# Patient Record
Sex: FEMALE | Race: WHITE | Employment: OTHER | URBAN - METROPOLITAN AREA
[De-identification: names, ages, dates, MRNs, and addresses within clinical notes are randomized per-mention and may not be internally consistent; named-entity substitution may affect disease eponyms.]

---

## 2017-09-11 ENCOUNTER — HOSPITAL ENCOUNTER (INPATIENT)
Age: 82
LOS: 7 days | Discharge: NURSING, REHAB OR LONG-TERM CARE | DRG: 186 | End: 2017-09-18
Attending: EMERGENCY MEDICINE | Admitting: HOSPITALIST
Payer: MEDICARE

## 2017-09-11 ENCOUNTER — APPOINTMENT (OUTPATIENT)
Dept: CT IMAGING | Age: 82
DRG: 186 | End: 2017-09-11
Payer: MEDICARE

## 2017-09-11 DIAGNOSIS — R91.8 MASS OF LUNG: ICD-10-CM

## 2017-09-11 DIAGNOSIS — J90 UNSPECIFIED PLEURAL EFFUSION: ICD-10-CM

## 2017-09-11 DIAGNOSIS — R06.00 DYSPNEA AND RESPIRATORY ABNORMALITIES: ICD-10-CM

## 2017-09-11 DIAGNOSIS — J96.00 ACUTE RESPIRATORY FAILURE, UNSPECIFIED WHETHER WITH HYPOXIA OR HYPERCAPNIA (HCC): Primary | ICD-10-CM

## 2017-09-11 DIAGNOSIS — R06.89 DYSPNEA AND RESPIRATORY ABNORMALITIES: ICD-10-CM

## 2017-09-11 DIAGNOSIS — I16.0 HYPERTENSIVE URGENCY: ICD-10-CM

## 2017-09-11 PROBLEM — R00.0 TACHYCARDIA: Status: ACTIVE | Noted: 2017-09-11

## 2017-09-11 PROBLEM — R06.82 TACHYPNEA: Status: ACTIVE | Noted: 2017-09-11

## 2017-09-11 LAB
ALBUMIN SERPL-MCNC: 3.5 G/DL (ref 3.9–4.9)
ALP BLD-CCNC: 70 U/L (ref 40–130)
ALT SERPL-CCNC: 23 U/L (ref 0–33)
ANION GAP SERPL CALCULATED.3IONS-SCNC: 12 MEQ/L (ref 7–13)
AST SERPL-CCNC: 33 U/L (ref 0–35)
BILIRUB SERPL-MCNC: 0.9 MG/DL (ref 0–1.2)
BUN BLDV-MCNC: 17 MG/DL (ref 8–23)
CALCIUM SERPL-MCNC: 8.6 MG/DL (ref 8.6–10.2)
CEA: 1.3 NG/ML (ref 0–5.5)
CHLORIDE BLD-SCNC: 94 MEQ/L (ref 98–107)
CK MB: 4.4 NG/ML (ref 0–3.8)
CO2: 27 MEQ/L (ref 22–29)
CREAT SERPL-MCNC: 0.52 MG/DL (ref 0.5–0.9)
CREATINE KINASE-MB INDEX: 5.2 % (ref 0–3.5)
D DIMER: 12.84 MG/L FEU (ref 0–0.5)
GFR AFRICAN AMERICAN: >60
GFR NON-AFRICAN AMERICAN: >60
GLOBULIN: 3.2 G/DL (ref 2.3–3.5)
GLUCOSE BLD-MCNC: 127 MG/DL (ref 74–109)
HCT VFR BLD CALC: 33.6 % (ref 37–47)
HEMOGLOBIN: 11.1 G/DL (ref 12–16)
MCH RBC QN AUTO: 31.1 PG (ref 27–31.3)
MCHC RBC AUTO-ENTMCNC: 33.1 % (ref 33–37)
MCV RBC AUTO: 93.9 FL (ref 82–100)
PDW BLD-RTO: 15.2 % (ref 11.5–14.5)
PLATELET # BLD: 184 K/UL (ref 130–400)
PLATELET SLIDE REVIEW: ADEQUATE
POTASSIUM SERPL-SCNC: 4.5 MEQ/L (ref 3.5–5.1)
RBC # BLD: 3.58 M/UL (ref 4.2–5.4)
SEDIMENTATION RATE, ERYTHROCYTE: 48 MM (ref 0–30)
SODIUM BLD-SCNC: 133 MEQ/L (ref 132–144)
TOTAL CK: 85 U/L (ref 0–170)
TOTAL PROTEIN: 6.7 G/DL (ref 6.4–8.1)
TROPONIN: 0.02 NG/ML (ref 0–0.01)
TROPONIN: <0.01 NG/ML (ref 0–0.01)
WBC # BLD: 7.7 K/UL (ref 4.8–10.8)

## 2017-09-11 PROCEDURE — 6360000002 HC RX W HCPCS: Performed by: EMERGENCY MEDICINE

## 2017-09-11 PROCEDURE — 71250 CT THORAX DX C-: CPT

## 2017-09-11 PROCEDURE — 96375 TX/PRO/DX INJ NEW DRUG ADDON: CPT

## 2017-09-11 PROCEDURE — 2700000000 HC OXYGEN THERAPY PER DAY

## 2017-09-11 PROCEDURE — 96365 THER/PROPH/DIAG IV INF INIT: CPT

## 2017-09-11 PROCEDURE — 80053 COMPREHEN METABOLIC PANEL: CPT

## 2017-09-11 PROCEDURE — 82553 CREATINE MB FRACTION: CPT

## 2017-09-11 PROCEDURE — 36415 COLL VENOUS BLD VENIPUNCTURE: CPT

## 2017-09-11 PROCEDURE — 85027 COMPLETE CBC AUTOMATED: CPT

## 2017-09-11 PROCEDURE — 93005 ELECTROCARDIOGRAM TRACING: CPT

## 2017-09-11 PROCEDURE — 84484 ASSAY OF TROPONIN QUANT: CPT

## 2017-09-11 PROCEDURE — 2500000003 HC RX 250 WO HCPCS: Performed by: EMERGENCY MEDICINE

## 2017-09-11 PROCEDURE — 99285 EMERGENCY DEPT VISIT HI MDM: CPT

## 2017-09-11 PROCEDURE — 51702 INSERT TEMP BLADDER CATH: CPT

## 2017-09-11 PROCEDURE — 85652 RBC SED RATE AUTOMATED: CPT

## 2017-09-11 PROCEDURE — 82378 CARCINOEMBRYONIC ANTIGEN: CPT

## 2017-09-11 PROCEDURE — 94660 CPAP INITIATION&MGMT: CPT

## 2017-09-11 PROCEDURE — 82550 ASSAY OF CK (CPK): CPT

## 2017-09-11 PROCEDURE — 2000000000 HC ICU R&B

## 2017-09-11 PROCEDURE — 85379 FIBRIN DEGRADATION QUANT: CPT

## 2017-09-11 PROCEDURE — 87040 BLOOD CULTURE FOR BACTERIA: CPT

## 2017-09-11 PROCEDURE — 86141 C-REACTIVE PROTEIN HS: CPT

## 2017-09-11 RX ORDER — SODIUM CHLORIDE 0.9 % (FLUSH) 0.9 %
10 SYRINGE (ML) INJECTION PRN
Status: DISCONTINUED | OUTPATIENT
Start: 2017-09-11 | End: 2017-09-18 | Stop reason: HOSPADM

## 2017-09-11 RX ORDER — HYDROXYCHLOROQUINE SULFATE 200 MG/1
200 TABLET, FILM COATED ORAL DAILY
Status: DISCONTINUED | OUTPATIENT
Start: 2017-09-12 | End: 2017-09-18 | Stop reason: HOSPADM

## 2017-09-11 RX ORDER — PANTOPRAZOLE SODIUM 40 MG/1
40 GRANULE, DELAYED RELEASE ORAL
COMMUNITY
End: 2018-03-02 | Stop reason: CLARIF

## 2017-09-11 RX ORDER — FUROSEMIDE 10 MG/ML
40 INJECTION INTRAMUSCULAR; INTRAVENOUS 2 TIMES DAILY
Status: DISCONTINUED | OUTPATIENT
Start: 2017-09-12 | End: 2017-09-11

## 2017-09-11 RX ORDER — HYDROXYCHLOROQUINE SULFATE 200 MG/1
200 TABLET, FILM COATED ORAL DAILY
COMMUNITY
End: 2018-03-02 | Stop reason: CLARIF

## 2017-09-11 RX ORDER — ACETAMINOPHEN 325 MG/1
650 TABLET ORAL EVERY 4 HOURS PRN
Status: DISCONTINUED | OUTPATIENT
Start: 2017-09-11 | End: 2017-09-18 | Stop reason: HOSPADM

## 2017-09-11 RX ORDER — PANTOPRAZOLE SODIUM 40 MG/1
40 GRANULE, DELAYED RELEASE ORAL
Status: DISCONTINUED | OUTPATIENT
Start: 2017-09-12 | End: 2017-09-18 | Stop reason: HOSPADM

## 2017-09-11 RX ORDER — NITROGLYCERIN 20 MG/100ML
5 INJECTION INTRAVENOUS CONTINUOUS
Status: DISCONTINUED | OUTPATIENT
Start: 2017-09-11 | End: 2017-09-11 | Stop reason: HOSPADM

## 2017-09-11 RX ORDER — SODIUM CHLORIDE 0.9 % (FLUSH) 0.9 %
10 SYRINGE (ML) INJECTION EVERY 12 HOURS SCHEDULED
Status: DISCONTINUED | OUTPATIENT
Start: 2017-09-11 | End: 2017-09-18 | Stop reason: HOSPADM

## 2017-09-11 RX ORDER — FUROSEMIDE 10 MG/ML
40 INJECTION INTRAMUSCULAR; INTRAVENOUS ONCE
Status: COMPLETED | OUTPATIENT
Start: 2017-09-11 | End: 2017-09-11

## 2017-09-11 RX ORDER — ONDANSETRON 2 MG/ML
4 INJECTION INTRAMUSCULAR; INTRAVENOUS EVERY 6 HOURS PRN
Status: DISCONTINUED | OUTPATIENT
Start: 2017-09-11 | End: 2017-09-18 | Stop reason: HOSPADM

## 2017-09-11 RX ORDER — DOCUSATE SODIUM 100 MG/1
100 CAPSULE, LIQUID FILLED ORAL 2 TIMES DAILY
Status: DISCONTINUED | OUTPATIENT
Start: 2017-09-11 | End: 2017-09-16 | Stop reason: CLARIF

## 2017-09-11 RX ADMIN — FUROSEMIDE 40 MG: 10 INJECTION, SOLUTION INTRAVENOUS at 19:23

## 2017-09-11 ASSESSMENT — ENCOUNTER SYMPTOMS
COLOR CHANGE: 0
SHORTNESS OF BREATH: 1
RHINORRHEA: 0
EYE DISCHARGE: 0
PHOTOPHOBIA: 0
ABDOMINAL DISTENTION: 0
ABDOMINAL PAIN: 0
VOMITING: 0
FACIAL SWELLING: 0
WHEEZING: 0

## 2017-09-11 ASSESSMENT — PAIN SCALES - GENERAL: PAINLEVEL_OUTOF10: 0

## 2017-09-12 ENCOUNTER — APPOINTMENT (OUTPATIENT)
Dept: GENERAL RADIOLOGY | Age: 82
DRG: 186 | End: 2017-09-12
Payer: MEDICARE

## 2017-09-12 PROBLEM — I21.4 NSTEMI (NON-ST ELEVATED MYOCARDIAL INFARCTION) (HCC): Status: ACTIVE | Noted: 2017-09-12

## 2017-09-12 LAB
ANION GAP SERPL CALCULATED.3IONS-SCNC: 15 MEQ/L (ref 7–13)
BASOPHILS ABSOLUTE: 0 K/UL (ref 0–0.2)
BASOPHILS RELATIVE PERCENT: 0.6 %
BUN BLDV-MCNC: 16 MG/DL (ref 8–23)
C-REACTIVE PROTEIN, HIGH SENSITIVITY: 53.3 MG/L (ref 0–5)
CALCIUM SERPL-MCNC: 8.5 MG/DL (ref 8.6–10.2)
CHLORIDE BLD-SCNC: 91 MEQ/L (ref 98–107)
CK MB: 3.7 NG/ML (ref 0–3.8)
CK MB: 3.9 NG/ML (ref 0–3.8)
CO2: 30 MEQ/L (ref 22–29)
CREAT SERPL-MCNC: 0.49 MG/DL (ref 0.5–0.9)
CREATINE KINASE-MB INDEX: 2.3 % (ref 0–3.5)
CREATINE KINASE-MB INDEX: 3.4 % (ref 0–3.5)
EOSINOPHILS ABSOLUTE: 0.1 K/UL (ref 0–0.7)
EOSINOPHILS RELATIVE PERCENT: 1.1 %
GFR AFRICAN AMERICAN: >60
GFR NON-AFRICAN AMERICAN: >60
GLUCOSE BLD-MCNC: 95 MG/DL (ref 74–109)
HCT VFR BLD CALC: 34.5 % (ref 37–47)
HEMOGLOBIN: 11.4 G/DL (ref 12–16)
LV EF: 53 %
LVEF MODALITY: NORMAL
LYMPHOCYTES ABSOLUTE: 1.2 K/UL (ref 1–4.8)
LYMPHOCYTES RELATIVE PERCENT: 14.7 %
MAGNESIUM: 1.8 MG/DL (ref 1.7–2.3)
MCH RBC QN AUTO: 31.2 PG (ref 27–31.3)
MCHC RBC AUTO-ENTMCNC: 33 % (ref 33–37)
MCV RBC AUTO: 94.4 FL (ref 82–100)
MONOCYTES ABSOLUTE: 0.9 K/UL (ref 0.2–0.8)
MONOCYTES RELATIVE PERCENT: 11.7 %
NEUTROPHILS ABSOLUTE: 5.7 K/UL (ref 1.4–6.5)
NEUTROPHILS RELATIVE PERCENT: 71.9 %
PDW BLD-RTO: 15.3 % (ref 11.5–14.5)
PLATELET # BLD: 233 K/UL (ref 130–400)
POTASSIUM SERPL-SCNC: 3.6 MEQ/L (ref 3.5–5.1)
POTASSIUM SERPL-SCNC: 3.7 MEQ/L (ref 3.5–5.1)
RBC # BLD: 3.65 M/UL (ref 4.2–5.4)
SODIUM BLD-SCNC: 136 MEQ/L (ref 132–144)
TOTAL CK: 116 U/L (ref 0–170)
TOTAL CK: 163 U/L (ref 0–170)
TROPONIN: 0.02 NG/ML (ref 0–0.01)
TROPONIN: 0.02 NG/ML (ref 0–0.01)
WBC # BLD: 7.9 K/UL (ref 4.8–10.8)

## 2017-09-12 PROCEDURE — 85025 COMPLETE CBC W/AUTO DIFF WBC: CPT

## 2017-09-12 PROCEDURE — 84132 ASSAY OF SERUM POTASSIUM: CPT

## 2017-09-12 PROCEDURE — 82553 CREATINE MB FRACTION: CPT

## 2017-09-12 PROCEDURE — 6360000002 HC RX W HCPCS: Performed by: INTERNAL MEDICINE

## 2017-09-12 PROCEDURE — 82550 ASSAY OF CK (CPK): CPT

## 2017-09-12 PROCEDURE — 83735 ASSAY OF MAGNESIUM: CPT

## 2017-09-12 PROCEDURE — 93306 TTE W/DOPPLER COMPLETE: CPT

## 2017-09-12 PROCEDURE — G8987 SELF CARE CURRENT STATUS: HCPCS

## 2017-09-12 PROCEDURE — 84484 ASSAY OF TROPONIN QUANT: CPT

## 2017-09-12 PROCEDURE — 2580000003 HC RX 258: Performed by: INTERNAL MEDICINE

## 2017-09-12 PROCEDURE — 99223 1ST HOSP IP/OBS HIGH 75: CPT | Performed by: INTERNAL MEDICINE

## 2017-09-12 PROCEDURE — 71010 XR CHEST PORTABLE: CPT

## 2017-09-12 PROCEDURE — 6370000000 HC RX 637 (ALT 250 FOR IP): Performed by: HOSPITALIST

## 2017-09-12 PROCEDURE — 36415 COLL VENOUS BLD VENIPUNCTURE: CPT

## 2017-09-12 PROCEDURE — 2580000003 HC RX 258: Performed by: HOSPITALIST

## 2017-09-12 PROCEDURE — 2060000000 HC ICU INTERMEDIATE R&B

## 2017-09-12 PROCEDURE — 2700000000 HC OXYGEN THERAPY PER DAY

## 2017-09-12 PROCEDURE — 6360000002 HC RX W HCPCS: Performed by: HOSPITALIST

## 2017-09-12 PROCEDURE — 97165 OT EVAL LOW COMPLEX 30 MIN: CPT

## 2017-09-12 PROCEDURE — 80048 BASIC METABOLIC PNL TOTAL CA: CPT

## 2017-09-12 PROCEDURE — G8988 SELF CARE GOAL STATUS: HCPCS

## 2017-09-12 PROCEDURE — 51702 INSERT TEMP BLADDER CATH: CPT

## 2017-09-12 RX ORDER — POTASSIUM CHLORIDE 20 MEQ/1
40 TABLET, EXTENDED RELEASE ORAL PRN
Status: DISCONTINUED | OUTPATIENT
Start: 2017-09-12 | End: 2017-09-18 | Stop reason: HOSPADM

## 2017-09-12 RX ORDER — FOLIC ACID 1 MG/1
1 TABLET ORAL DAILY
COMMUNITY
End: 2018-03-02 | Stop reason: CLARIF

## 2017-09-12 RX ORDER — POTASSIUM CHLORIDE 7.45 MG/ML
10 INJECTION INTRAVENOUS PRN
Status: DISCONTINUED | OUTPATIENT
Start: 2017-09-12 | End: 2017-09-18 | Stop reason: HOSPADM

## 2017-09-12 RX ORDER — POTASSIUM CHLORIDE 20MEQ/15ML
40 LIQUID (ML) ORAL PRN
Status: DISCONTINUED | OUTPATIENT
Start: 2017-09-12 | End: 2017-09-18 | Stop reason: HOSPADM

## 2017-09-12 RX ADMIN — FUROSEMIDE 5 MG/HR: 10 INJECTION, SOLUTION INTRAVENOUS at 00:18

## 2017-09-12 RX ADMIN — Medication 10 ML: at 00:22

## 2017-09-12 RX ADMIN — ENOXAPARIN SODIUM 40 MG: 40 INJECTION SUBCUTANEOUS at 07:40

## 2017-09-12 RX ADMIN — HYDROXYCHLOROQUINE SULFATE 200 MG: 200 TABLET, FILM COATED ORAL at 07:40

## 2017-09-12 RX ADMIN — FUROSEMIDE 5 MG/HR: 10 INJECTION, SOLUTION INTRAVENOUS at 17:18

## 2017-09-12 RX ADMIN — PANTOPRAZOLE SODIUM 40 MG: 40 GRANULE, DELAYED RELEASE ORAL at 07:40

## 2017-09-12 RX ADMIN — Medication 10 ML: at 21:20

## 2017-09-12 RX ADMIN — DOCUSATE SODIUM 100 MG: 100 CAPSULE, LIQUID FILLED ORAL at 07:40

## 2017-09-12 RX ADMIN — DOCUSATE SODIUM 100 MG: 100 CAPSULE, LIQUID FILLED ORAL at 21:20

## 2017-09-12 RX ADMIN — ACETAMINOPHEN 650 MG: 325 TABLET ORAL at 07:51

## 2017-09-12 ASSESSMENT — PAIN DESCRIPTION - ONSET: ONSET: GRADUAL

## 2017-09-12 ASSESSMENT — PAIN SCALES - GENERAL
PAINLEVEL_OUTOF10: 0
PAINLEVEL_OUTOF10: 0
PAINLEVEL_OUTOF10: 3
PAINLEVEL_OUTOF10: 0
PAINLEVEL_OUTOF10: 5
PAINLEVEL_OUTOF10: 5

## 2017-09-12 ASSESSMENT — PAIN DESCRIPTION - PROGRESSION
CLINICAL_PROGRESSION: NOT CHANGED

## 2017-09-12 ASSESSMENT — PAIN DESCRIPTION - LOCATION: LOCATION: FOOT

## 2017-09-12 ASSESSMENT — PAIN DESCRIPTION - PAIN TYPE: TYPE: CHRONIC PAIN

## 2017-09-12 ASSESSMENT — PAIN DESCRIPTION - ORIENTATION: ORIENTATION: LEFT

## 2017-09-13 ENCOUNTER — APPOINTMENT (OUTPATIENT)
Dept: CT IMAGING | Age: 82
DRG: 186 | End: 2017-09-13
Payer: MEDICARE

## 2017-09-13 LAB
ALBUMIN SERPL-MCNC: 3.1 G/DL (ref 3.9–4.9)
ALP BLD-CCNC: 60 U/L (ref 40–130)
ALT SERPL-CCNC: 18 U/L (ref 0–33)
ANION GAP SERPL CALCULATED.3IONS-SCNC: 17 MEQ/L (ref 7–13)
AST SERPL-CCNC: 24 U/L (ref 0–35)
BASE EXCESS ARTERIAL: 5 (ref -3–3)
BILIRUB SERPL-MCNC: 0.8 MG/DL (ref 0–1.2)
BUN BLDV-MCNC: 28 MG/DL (ref 8–23)
CALCIUM SERPL-MCNC: 8.1 MG/DL (ref 8.6–10.2)
CHLORIDE BLD-SCNC: 89 MEQ/L (ref 98–107)
CK MB: 3.3 NG/ML (ref 0–3.8)
CO2: 29 MEQ/L (ref 22–29)
CREAT SERPL-MCNC: 1.06 MG/DL (ref 0.5–0.9)
CREATINE KINASE-MB INDEX: 2.1 % (ref 0–3.5)
GFR AFRICAN AMERICAN: 58.1
GFR NON-AFRICAN AMERICAN: 48
GLOBULIN: 3 G/DL (ref 2.3–3.5)
GLUCOSE BLD-MCNC: 119 MG/DL (ref 74–109)
HCO3 ARTERIAL: 29.5 MMOL/L (ref 21–29)
HCT VFR BLD CALC: 33.6 % (ref 37–47)
HEMOGLOBIN: 11 G/DL (ref 12–16)
INR BLD: 1.2
LACTATE: 7.35 MMOL/L (ref 0.4–2)
MAGNESIUM: 1.7 MG/DL (ref 1.7–2.3)
MCH RBC QN AUTO: 30.7 PG (ref 27–31.3)
MCHC RBC AUTO-ENTMCNC: 32.7 % (ref 33–37)
MCV RBC AUTO: 93.8 FL (ref 82–100)
O2 SAT, ARTERIAL: 98 % (ref 93–100)
PCO2 ARTERIAL: 47 MM HG (ref 35–45)
PDW BLD-RTO: 15.1 % (ref 11.5–14.5)
PERFORMED ON: ABNORMAL
PH ARTERIAL: 7.41 (ref 7.35–7.45)
PLATELET # BLD: 234 K/UL (ref 130–400)
PO2 ARTERIAL: 114 MM HG (ref 75–108)
POC SAMPLE TYPE: ABNORMAL
POTASSIUM SERPL-SCNC: 3.3 MEQ/L (ref 3.5–5.1)
POTASSIUM SERPL-SCNC: 3.3 MEQ/L (ref 3.5–5.1)
POTASSIUM SERPL-SCNC: 3.4 MEQ/L (ref 3.5–5.1)
PROTHROMBIN TIME: 12.3 SEC (ref 8.1–13.7)
RBC # BLD: 3.58 M/UL (ref 4.2–5.4)
SODIUM BLD-SCNC: 135 MEQ/L (ref 132–144)
TCO2 ARTERIAL: 31 (ref 22–29)
TOTAL CK: 160 U/L (ref 0–170)
TOTAL PROTEIN: 6.1 G/DL (ref 6.4–8.1)
TROPONIN: 0.03 NG/ML (ref 0–0.01)
WBC # BLD: 11.4 K/UL (ref 4.8–10.8)

## 2017-09-13 PROCEDURE — 6370000000 HC RX 637 (ALT 250 FOR IP): Performed by: HOSPITALIST

## 2017-09-13 PROCEDURE — 84132 ASSAY OF SERUM POTASSIUM: CPT

## 2017-09-13 PROCEDURE — 93010 ELECTROCARDIOGRAM REPORT: CPT | Performed by: INTERNAL MEDICINE

## 2017-09-13 PROCEDURE — 82803 BLOOD GASES ANY COMBINATION: CPT

## 2017-09-13 PROCEDURE — 82550 ASSAY OF CK (CPK): CPT

## 2017-09-13 PROCEDURE — 2580000003 HC RX 258: Performed by: INTERNAL MEDICINE

## 2017-09-13 PROCEDURE — 6360000002 HC RX W HCPCS: Performed by: INTERNAL MEDICINE

## 2017-09-13 PROCEDURE — 85610 PROTHROMBIN TIME: CPT

## 2017-09-13 PROCEDURE — 84484 ASSAY OF TROPONIN QUANT: CPT

## 2017-09-13 PROCEDURE — 2580000003 HC RX 258: Performed by: ANESTHESIOLOGY

## 2017-09-13 PROCEDURE — 36600 WITHDRAWAL OF ARTERIAL BLOOD: CPT

## 2017-09-13 PROCEDURE — 80053 COMPREHEN METABOLIC PANEL: CPT

## 2017-09-13 PROCEDURE — 83735 ASSAY OF MAGNESIUM: CPT

## 2017-09-13 PROCEDURE — 95816 EEG AWAKE AND DROWSY: CPT

## 2017-09-13 PROCEDURE — 6360000002 HC RX W HCPCS: Performed by: HOSPITALIST

## 2017-09-13 PROCEDURE — 70450 CT HEAD/BRAIN W/O DYE: CPT

## 2017-09-13 PROCEDURE — 83605 ASSAY OF LACTIC ACID: CPT

## 2017-09-13 PROCEDURE — 2000000000 HC ICU R&B

## 2017-09-13 PROCEDURE — 85027 COMPLETE CBC AUTOMATED: CPT

## 2017-09-13 PROCEDURE — 6360000002 HC RX W HCPCS: Performed by: ANESTHESIOLOGY

## 2017-09-13 PROCEDURE — G8996 SWALLOW CURRENT STATUS: HCPCS

## 2017-09-13 PROCEDURE — 2700000000 HC OXYGEN THERAPY PER DAY

## 2017-09-13 PROCEDURE — 82553 CREATINE MB FRACTION: CPT

## 2017-09-13 PROCEDURE — G8997 SWALLOW GOAL STATUS: HCPCS

## 2017-09-13 PROCEDURE — 2580000003 HC RX 258: Performed by: HOSPITALIST

## 2017-09-13 PROCEDURE — 36415 COLL VENOUS BLD VENIPUNCTURE: CPT

## 2017-09-13 PROCEDURE — 92610 EVALUATE SWALLOWING FUNCTION: CPT

## 2017-09-13 PROCEDURE — 99291 CRITICAL CARE FIRST HOUR: CPT | Performed by: ANESTHESIOLOGY

## 2017-09-13 RX ORDER — POTASSIUM CHLORIDE 7.45 MG/ML
20 INJECTION INTRAVENOUS ONCE
Status: COMPLETED | OUTPATIENT
Start: 2017-09-13 | End: 2017-09-13

## 2017-09-13 RX ADMIN — POTASSIUM CHLORIDE 20 MEQ: 10 INJECTION, SOLUTION INTRAVENOUS at 14:44

## 2017-09-13 RX ADMIN — Medication 10 ML: at 23:05

## 2017-09-13 RX ADMIN — MAGNESIUM SULFATE HEPTAHYDRATE 1 G: 500 INJECTION, SOLUTION INTRAMUSCULAR; INTRAVENOUS at 13:39

## 2017-09-13 RX ADMIN — LEVETIRACETAM 750 MG: 100 INJECTION, SOLUTION INTRAVENOUS at 13:28

## 2017-09-13 RX ADMIN — ACETAMINOPHEN 650 MG: 325 TABLET ORAL at 04:23

## 2017-09-13 RX ADMIN — FUROSEMIDE 2 MG/HR: 10 INJECTION, SOLUTION INTRAVENOUS at 12:18

## 2017-09-13 RX ADMIN — Medication 10 ML: at 12:18

## 2017-09-13 ASSESSMENT — PAIN SCALES - GENERAL
PAINLEVEL_OUTOF10: 0
PAINLEVEL_OUTOF10: 5
PAINLEVEL_OUTOF10: 0

## 2017-09-14 ENCOUNTER — APPOINTMENT (OUTPATIENT)
Dept: MRI IMAGING | Age: 82
DRG: 186 | End: 2017-09-14
Payer: MEDICARE

## 2017-09-14 LAB
CK MB: 2.6 NG/ML (ref 0–3.8)
CREATINE KINASE-MB INDEX: 1.6 % (ref 0–3.5)
POTASSIUM SERPL-SCNC: 4 MEQ/L (ref 3.5–5.1)
TOTAL CK: 164 U/L (ref 0–170)
TROPONIN: 0.04 NG/ML (ref 0–0.01)

## 2017-09-14 PROCEDURE — 2580000003 HC RX 258: Performed by: PSYCHIATRY & NEUROLOGY

## 2017-09-14 PROCEDURE — 6360000002 HC RX W HCPCS: Performed by: PSYCHIATRY & NEUROLOGY

## 2017-09-14 PROCEDURE — 6370000000 HC RX 637 (ALT 250 FOR IP): Performed by: HOSPITALIST

## 2017-09-14 PROCEDURE — 82553 CREATINE MB FRACTION: CPT

## 2017-09-14 PROCEDURE — 1210000000 HC MED SURG R&B

## 2017-09-14 PROCEDURE — 82550 ASSAY OF CK (CPK): CPT

## 2017-09-14 PROCEDURE — 70551 MRI BRAIN STEM W/O DYE: CPT

## 2017-09-14 PROCEDURE — 84132 ASSAY OF SERUM POTASSIUM: CPT

## 2017-09-14 PROCEDURE — 84484 ASSAY OF TROPONIN QUANT: CPT

## 2017-09-14 PROCEDURE — 2580000003 HC RX 258: Performed by: HOSPITALIST

## 2017-09-14 PROCEDURE — 99231 SBSQ HOSP IP/OBS SF/LOW 25: CPT | Performed by: ANESTHESIOLOGY

## 2017-09-14 PROCEDURE — 36415 COLL VENOUS BLD VENIPUNCTURE: CPT

## 2017-09-14 PROCEDURE — 6370000000 HC RX 637 (ALT 250 FOR IP): Performed by: NURSE PRACTITIONER

## 2017-09-14 RX ADMIN — LEVETIRACETAM 500 MG: 100 INJECTION, SOLUTION INTRAVENOUS at 22:27

## 2017-09-14 RX ADMIN — ACETAMINOPHEN 650 MG: 325 TABLET ORAL at 08:45

## 2017-09-14 RX ADMIN — POTASSIUM CHLORIDE 40 MEQ: 20 SOLUTION ORAL at 00:21

## 2017-09-14 RX ADMIN — LEVETIRACETAM 500 MG: 100 INJECTION, SOLUTION INTRAVENOUS at 11:26

## 2017-09-14 RX ADMIN — Medication 10 ML: at 21:18

## 2017-09-14 RX ADMIN — LEVETIRACETAM 500 MG: 100 INJECTION, SOLUTION INTRAVENOUS at 01:00

## 2017-09-14 RX ADMIN — Medication 10 ML: at 08:55

## 2017-09-14 RX ADMIN — ACETAMINOPHEN 650 MG: 325 TABLET ORAL at 02:04

## 2017-09-14 RX ADMIN — DOCUSATE SODIUM 100 MG: 100 CAPSULE, LIQUID FILLED ORAL at 08:46

## 2017-09-14 RX ADMIN — DOCUSATE SODIUM 100 MG: 100 CAPSULE, LIQUID FILLED ORAL at 21:17

## 2017-09-14 RX ADMIN — PANTOPRAZOLE SODIUM 40 MG: 40 GRANULE, DELAYED RELEASE ORAL at 05:54

## 2017-09-14 RX ADMIN — HYDROXYCHLOROQUINE SULFATE 200 MG: 200 TABLET, FILM COATED ORAL at 08:46

## 2017-09-14 ASSESSMENT — PAIN SCALES - GENERAL
PAINLEVEL_OUTOF10: 0
PAINLEVEL_OUTOF10: 0
PAINLEVEL_OUTOF10: 4
PAINLEVEL_OUTOF10: 0
PAINLEVEL_OUTOF10: 5
PAINLEVEL_OUTOF10: 0
PAINLEVEL_OUTOF10: 4
PAINLEVEL_OUTOF10: 0

## 2017-09-14 ASSESSMENT — PAIN DESCRIPTION - PAIN TYPE: TYPE: CHRONIC PAIN

## 2017-09-14 ASSESSMENT — PAIN DESCRIPTION - LOCATION
LOCATION: MOUTH
LOCATION: CHEST

## 2017-09-14 ASSESSMENT — PAIN DESCRIPTION - ORIENTATION
ORIENTATION: LEFT
ORIENTATION: LOWER;RIGHT

## 2017-09-14 ASSESSMENT — PAIN DESCRIPTION - DESCRIPTORS: DESCRIPTORS: SORE

## 2017-09-15 ENCOUNTER — APPOINTMENT (OUTPATIENT)
Dept: ULTRASOUND IMAGING | Age: 82
DRG: 186 | End: 2017-09-15
Payer: MEDICARE

## 2017-09-15 ENCOUNTER — APPOINTMENT (OUTPATIENT)
Dept: GENERAL RADIOLOGY | Age: 82
DRG: 186 | End: 2017-09-15
Payer: MEDICARE

## 2017-09-15 LAB
AMYLASE FLUID: 29 U/L
ANION GAP SERPL CALCULATED.3IONS-SCNC: 16 MEQ/L (ref 7–13)
APPEARANCE FLUID: CLEAR
BUN BLDV-MCNC: 27 MG/DL (ref 8–23)
CALCIUM SERPL-MCNC: 8.5 MG/DL (ref 8.6–10.2)
CELL COUNT FLUID TYPE: NORMAL
CHLORIDE BLD-SCNC: 94 MEQ/L (ref 98–107)
CLOT EVALUATION: NORMAL
CO2: 28 MEQ/L (ref 22–29)
COLOR FLUID: YELLOW
CREAT SERPL-MCNC: 0.71 MG/DL (ref 0.5–0.9)
FLUID TYPE: NORMAL
GFR AFRICAN AMERICAN: >60
GFR NON-AFRICAN AMERICAN: >60
GLUCOSE BLD-MCNC: 161 MG/DL (ref 74–109)
GLUCOSE, FLUID: 111.5 MG/DL
HCT VFR BLD CALC: 36.2 % (ref 37–47)
HEMOGLOBIN: 11.6 G/DL (ref 12–16)
LD, FLUID: 139 U/L
LYMPHOCYTES, BODY FLUID: 95 %
MAGNESIUM: 2.2 MG/DL (ref 1.7–2.3)
MCH RBC QN AUTO: 30.5 PG (ref 27–31.3)
MCHC RBC AUTO-ENTMCNC: 32.1 % (ref 33–37)
MCV RBC AUTO: 95.2 FL (ref 82–100)
MONOCYTE, FLUID: 4 %
NEUTROPHIL, FLUID: 1 %
NUCLEATED CELLS FLUID: 518 /CUMM
NUMBER OF CELLS COUNTED FLUID: 100
PDW BLD-RTO: 15.5 % (ref 11.5–14.5)
PLATELET # BLD: 275 K/UL (ref 130–400)
POTASSIUM SERPL-SCNC: 3.9 MEQ/L (ref 3.5–5.1)
PROTEIN FLUID: 2.6 G/DL
RBC # BLD: 3.8 M/UL (ref 4.2–5.4)
RBC FLUID: 890 /CUMM
SODIUM BLD-SCNC: 138 MEQ/L (ref 132–144)
WBC # BLD: 10.2 K/UL (ref 4.8–10.8)

## 2017-09-15 PROCEDURE — 6370000000 HC RX 637 (ALT 250 FOR IP): Performed by: HOSPITALIST

## 2017-09-15 PROCEDURE — 2580000003 HC RX 258: Performed by: HOSPITALIST

## 2017-09-15 PROCEDURE — G8979 MOBILITY GOAL STATUS: HCPCS

## 2017-09-15 PROCEDURE — 32555 ASPIRATE PLEURA W/ IMAGING: CPT | Performed by: RADIOLOGY

## 2017-09-15 PROCEDURE — 82150 ASSAY OF AMYLASE: CPT

## 2017-09-15 PROCEDURE — 97161 PT EVAL LOW COMPLEX 20 MIN: CPT

## 2017-09-15 PROCEDURE — 84157 ASSAY OF PROTEIN OTHER: CPT

## 2017-09-15 PROCEDURE — 88305 TISSUE EXAM BY PATHOLOGIST: CPT

## 2017-09-15 PROCEDURE — 71020 XR CHEST STANDARD TWO VW: CPT | Performed by: RADIOLOGY

## 2017-09-15 PROCEDURE — 85027 COMPLETE CBC AUTOMATED: CPT

## 2017-09-15 PROCEDURE — 87070 CULTURE OTHR SPECIMN AEROBIC: CPT

## 2017-09-15 PROCEDURE — 36415 COLL VENOUS BLD VENIPUNCTURE: CPT

## 2017-09-15 PROCEDURE — C1729 CATH, DRAINAGE: HCPCS

## 2017-09-15 PROCEDURE — 80048 BASIC METABOLIC PNL TOTAL CA: CPT

## 2017-09-15 PROCEDURE — 88112 CYTOPATH CELL ENHANCE TECH: CPT

## 2017-09-15 PROCEDURE — 93005 ELECTROCARDIOGRAM TRACING: CPT

## 2017-09-15 PROCEDURE — G8987 SELF CARE CURRENT STATUS: HCPCS

## 2017-09-15 PROCEDURE — 2500000003 HC RX 250 WO HCPCS: Performed by: RADIOLOGY

## 2017-09-15 PROCEDURE — 0W993ZX DRAINAGE OF RIGHT PLEURAL CAVITY, PERCUTANEOUS APPROACH, DIAGNOSTIC: ICD-10-PCS | Performed by: RADIOLOGY

## 2017-09-15 PROCEDURE — 97165 OT EVAL LOW COMPLEX 30 MIN: CPT

## 2017-09-15 PROCEDURE — 71020 XR CHEST STANDARD TWO VW: CPT

## 2017-09-15 PROCEDURE — 89051 BODY FLUID CELL COUNT: CPT

## 2017-09-15 PROCEDURE — G8988 SELF CARE GOAL STATUS: HCPCS

## 2017-09-15 PROCEDURE — 87205 SMEAR GRAM STAIN: CPT

## 2017-09-15 PROCEDURE — 1210000000 HC MED SURG R&B

## 2017-09-15 PROCEDURE — G8978 MOBILITY CURRENT STATUS: HCPCS

## 2017-09-15 PROCEDURE — 83735 ASSAY OF MAGNESIUM: CPT

## 2017-09-15 PROCEDURE — 6360000002 HC RX W HCPCS: Performed by: PSYCHIATRY & NEUROLOGY

## 2017-09-15 PROCEDURE — 99232 SBSQ HOSP IP/OBS MODERATE 35: CPT | Performed by: INTERNAL MEDICINE

## 2017-09-15 PROCEDURE — 2580000003 HC RX 258: Performed by: PSYCHIATRY & NEUROLOGY

## 2017-09-15 PROCEDURE — 83615 LACTATE (LD) (LDH) ENZYME: CPT

## 2017-09-15 PROCEDURE — 82945 GLUCOSE OTHER FLUID: CPT

## 2017-09-15 RX ORDER — LIDOCAINE HYDROCHLORIDE 20 MG/ML
INJECTION, SOLUTION INFILTRATION; PERINEURAL
Status: COMPLETED | OUTPATIENT
Start: 2017-09-15 | End: 2017-09-15

## 2017-09-15 RX ADMIN — Medication 10 ML: at 13:53

## 2017-09-15 RX ADMIN — LIDOCAINE HYDROCHLORIDE 10 ML: 20 INJECTION, SOLUTION INFILTRATION; PERINEURAL at 11:21

## 2017-09-15 RX ADMIN — PANTOPRAZOLE SODIUM 40 MG: 40 GRANULE, DELAYED RELEASE ORAL at 06:23

## 2017-09-15 RX ADMIN — ACETAMINOPHEN 650 MG: 325 TABLET ORAL at 12:52

## 2017-09-15 RX ADMIN — Medication 10 ML: at 21:51

## 2017-09-15 RX ADMIN — LEVETIRACETAM 500 MG: 100 INJECTION, SOLUTION INTRAVENOUS at 13:53

## 2017-09-15 ASSESSMENT — PAIN SCALES - GENERAL: PAINLEVEL_OUTOF10: 4

## 2017-09-16 ENCOUNTER — APPOINTMENT (OUTPATIENT)
Dept: CT IMAGING | Age: 82
DRG: 186 | End: 2017-09-16
Payer: MEDICARE

## 2017-09-16 LAB — GRAM STAIN RESULT: NORMAL

## 2017-09-16 PROCEDURE — 2580000003 HC RX 258: Performed by: HOSPITALIST

## 2017-09-16 PROCEDURE — 6360000002 HC RX W HCPCS: Performed by: PSYCHIATRY & NEUROLOGY

## 2017-09-16 PROCEDURE — 71260 CT THORAX DX C+: CPT

## 2017-09-16 PROCEDURE — 6360000004 HC RX CONTRAST MEDICATION: Performed by: RADIOLOGY

## 2017-09-16 PROCEDURE — 1210000000 HC MED SURG R&B

## 2017-09-16 PROCEDURE — 2580000003 HC RX 258: Performed by: PSYCHIATRY & NEUROLOGY

## 2017-09-16 PROCEDURE — 6370000000 HC RX 637 (ALT 250 FOR IP): Performed by: HOSPITALIST

## 2017-09-16 RX ADMIN — IOPAMIDOL 75 ML: 755 INJECTION, SOLUTION INTRAVENOUS at 13:07

## 2017-09-16 RX ADMIN — SODIUM CHLORIDE, PRESERVATIVE FREE 10 ML: 5 INJECTION INTRAVENOUS at 04:18

## 2017-09-16 RX ADMIN — DOCUSATE SODIUM 100 MG: 50 LIQUID ORAL at 09:07

## 2017-09-16 RX ADMIN — Medication 10 ML: at 22:00

## 2017-09-16 RX ADMIN — HYDROXYCHLOROQUINE SULFATE 200 MG: 200 TABLET, FILM COATED ORAL at 09:08

## 2017-09-16 RX ADMIN — PANTOPRAZOLE SODIUM 40 MG: 40 GRANULE, DELAYED RELEASE ORAL at 09:10

## 2017-09-16 RX ADMIN — Medication 10 ML: at 09:09

## 2017-09-16 RX ADMIN — LEVETIRACETAM 500 MG: 100 INJECTION, SOLUTION INTRAVENOUS at 16:09

## 2017-09-16 RX ADMIN — DOCUSATE SODIUM 100 MG: 50 LIQUID ORAL at 22:00

## 2017-09-16 RX ADMIN — LEVETIRACETAM 500 MG: 100 INJECTION, SOLUTION INTRAVENOUS at 04:17

## 2017-09-16 ASSESSMENT — PAIN SCALES - GENERAL
PAINLEVEL_OUTOF10: 0

## 2017-09-17 LAB
BLOOD CULTURE, ROUTINE: NORMAL
CULTURE, BLOOD 2: NORMAL

## 2017-09-17 PROCEDURE — 2580000003 HC RX 258: Performed by: HOSPITALIST

## 2017-09-17 PROCEDURE — 6370000000 HC RX 637 (ALT 250 FOR IP): Performed by: HOSPITALIST

## 2017-09-17 PROCEDURE — 2580000003 HC RX 258: Performed by: PSYCHIATRY & NEUROLOGY

## 2017-09-17 PROCEDURE — 6360000002 HC RX W HCPCS: Performed by: PSYCHIATRY & NEUROLOGY

## 2017-09-17 PROCEDURE — 99232 SBSQ HOSP IP/OBS MODERATE 35: CPT | Performed by: INTERNAL MEDICINE

## 2017-09-17 PROCEDURE — 1210000000 HC MED SURG R&B

## 2017-09-17 RX ADMIN — PANTOPRAZOLE SODIUM 40 MG: 40 GRANULE, DELAYED RELEASE ORAL at 06:12

## 2017-09-17 RX ADMIN — Medication 10 ML: at 21:34

## 2017-09-17 RX ADMIN — DOCUSATE SODIUM 100 MG: 50 LIQUID ORAL at 09:10

## 2017-09-17 RX ADMIN — Medication 10 ML: at 09:10

## 2017-09-17 RX ADMIN — LEVETIRACETAM 500 MG: 100 INJECTION, SOLUTION INTRAVENOUS at 16:06

## 2017-09-17 RX ADMIN — LEVETIRACETAM 500 MG: 100 INJECTION, SOLUTION INTRAVENOUS at 03:24

## 2017-09-17 RX ADMIN — HYDROXYCHLOROQUINE SULFATE 200 MG: 200 TABLET, FILM COATED ORAL at 09:10

## 2017-09-18 ENCOUNTER — HOSPITAL ENCOUNTER (INPATIENT)
Age: 82
LOS: 8 days | Discharge: HOME HEALTH | DRG: 056 | End: 2017-09-26
Attending: PHYSICAL MEDICINE & REHABILITATION | Admitting: PHYSICAL MEDICINE & REHABILITATION
Payer: MEDICARE

## 2017-09-18 ENCOUNTER — APPOINTMENT (OUTPATIENT)
Dept: GENERAL RADIOLOGY | Age: 82
DRG: 186 | End: 2017-09-18
Payer: MEDICARE

## 2017-09-18 VITALS
HEIGHT: 64 IN | DIASTOLIC BLOOD PRESSURE: 44 MMHG | WEIGHT: 113.76 LBS | OXYGEN SATURATION: 92 % | HEART RATE: 71 BPM | TEMPERATURE: 98.2 F | RESPIRATION RATE: 18 BRPM | SYSTOLIC BLOOD PRESSURE: 130 MMHG | BODY MASS INDEX: 19.42 KG/M2

## 2017-09-18 PROBLEM — R00.0 TACHYCARDIA: Status: RESOLVED | Noted: 2017-09-11 | Resolved: 2017-09-18

## 2017-09-18 PROBLEM — R06.82 TACHYPNEA: Status: RESOLVED | Noted: 2017-09-11 | Resolved: 2017-09-18

## 2017-09-18 PROBLEM — R13.12 DYSPHAGIA, OROPHARYNGEAL PHASE: Status: ACTIVE | Noted: 2017-09-18

## 2017-09-18 PROBLEM — R64 CACHEXIA (HCC): Status: ACTIVE | Noted: 2017-09-18

## 2017-09-18 LAB — BODY FLUID CULTURE, STERILE: NORMAL

## 2017-09-18 PROCEDURE — 2580000003 HC RX 258: Performed by: HOSPITALIST

## 2017-09-18 PROCEDURE — G8996 SWALLOW CURRENT STATUS: HCPCS

## 2017-09-18 PROCEDURE — 2500000003 HC RX 250 WO HCPCS: Performed by: RADIOLOGY

## 2017-09-18 PROCEDURE — 93010 ELECTROCARDIOGRAM REPORT: CPT | Performed by: INTERNAL MEDICINE

## 2017-09-18 PROCEDURE — 6360000002 HC RX W HCPCS: Performed by: HOSPITALIST

## 2017-09-18 PROCEDURE — 92526 ORAL FUNCTION THERAPY: CPT

## 2017-09-18 PROCEDURE — 6360000002 HC RX W HCPCS: Performed by: PSYCHIATRY & NEUROLOGY

## 2017-09-18 PROCEDURE — 93005 ELECTROCARDIOGRAM TRACING: CPT

## 2017-09-18 PROCEDURE — 2580000003 HC RX 258: Performed by: PSYCHIATRY & NEUROLOGY

## 2017-09-18 PROCEDURE — 1180000000 HC REHAB R&B

## 2017-09-18 PROCEDURE — 92611 MOTION FLUOROSCOPY/SWALLOW: CPT

## 2017-09-18 PROCEDURE — G8997 SWALLOW GOAL STATUS: HCPCS

## 2017-09-18 PROCEDURE — 99232 SBSQ HOSP IP/OBS MODERATE 35: CPT | Performed by: INTERNAL MEDICINE

## 2017-09-18 PROCEDURE — 74230 X-RAY XM SWLNG FUNCJ C+: CPT

## 2017-09-18 PROCEDURE — 6370000000 HC RX 637 (ALT 250 FOR IP): Performed by: HOSPITALIST

## 2017-09-18 PROCEDURE — 97116 GAIT TRAINING THERAPY: CPT

## 2017-09-18 RX ORDER — HYDROXYCHLOROQUINE SULFATE 200 MG/1
200 TABLET, FILM COATED ORAL DAILY
Status: DISCONTINUED | OUTPATIENT
Start: 2017-09-19 | End: 2017-09-20

## 2017-09-18 RX ORDER — ACETAMINOPHEN 325 MG/1
650 TABLET ORAL EVERY 4 HOURS PRN
Status: DISCONTINUED | OUTPATIENT
Start: 2017-09-18 | End: 2017-09-26 | Stop reason: HOSPADM

## 2017-09-18 RX ORDER — PANTOPRAZOLE SODIUM 40 MG/1
40 GRANULE, DELAYED RELEASE ORAL
Status: CANCELLED | OUTPATIENT
Start: 2017-09-19

## 2017-09-18 RX ORDER — PANTOPRAZOLE SODIUM 40 MG/1
40 GRANULE, DELAYED RELEASE ORAL
Status: DISCONTINUED | OUTPATIENT
Start: 2017-09-19 | End: 2017-09-26 | Stop reason: HOSPADM

## 2017-09-18 RX ORDER — POTASSIUM CHLORIDE 7.45 MG/ML
10 INJECTION INTRAVENOUS PRN
Status: CANCELLED | OUTPATIENT
Start: 2017-09-18

## 2017-09-18 RX ORDER — ONDANSETRON 2 MG/ML
4 INJECTION INTRAMUSCULAR; INTRAVENOUS EVERY 6 HOURS PRN
Status: CANCELLED | OUTPATIENT
Start: 2017-09-18

## 2017-09-18 RX ORDER — SODIUM PHOSPHATE, DIBASIC AND SODIUM PHOSPHATE, MONOBASIC 7; 19 G/133ML; G/133ML
1 ENEMA RECTAL
Status: DISPENSED | OUTPATIENT
Start: 2017-09-18 | End: 2017-09-18

## 2017-09-18 RX ORDER — ACETAMINOPHEN 325 MG/1
650 TABLET ORAL EVERY 4 HOURS PRN
Status: CANCELLED | OUTPATIENT
Start: 2017-09-18

## 2017-09-18 RX ORDER — POTASSIUM CHLORIDE 20 MEQ/1
40 TABLET, EXTENDED RELEASE ORAL PRN
Status: CANCELLED | OUTPATIENT
Start: 2017-09-18

## 2017-09-18 RX ORDER — BISACODYL 10 MG
10 SUPPOSITORY, RECTAL RECTAL DAILY PRN
Status: DISCONTINUED | OUTPATIENT
Start: 2017-09-18 | End: 2017-09-26 | Stop reason: HOSPADM

## 2017-09-18 RX ORDER — SODIUM CHLORIDE 0.9 % (FLUSH) 0.9 %
10 SYRINGE (ML) INJECTION PRN
Status: CANCELLED | OUTPATIENT
Start: 2017-09-18

## 2017-09-18 RX ORDER — POTASSIUM CHLORIDE 7.45 MG/ML
10 INJECTION INTRAVENOUS PRN
Status: DISCONTINUED | OUTPATIENT
Start: 2017-09-18 | End: 2017-09-26 | Stop reason: HOSPADM

## 2017-09-18 RX ORDER — HYDROXYCHLOROQUINE SULFATE 200 MG/1
200 TABLET, FILM COATED ORAL DAILY
Status: CANCELLED | OUTPATIENT
Start: 2017-09-19

## 2017-09-18 RX ORDER — POTASSIUM CHLORIDE 20MEQ/15ML
40 LIQUID (ML) ORAL PRN
Status: DISCONTINUED | OUTPATIENT
Start: 2017-09-18 | End: 2017-09-26 | Stop reason: HOSPADM

## 2017-09-18 RX ORDER — SODIUM CHLORIDE 0.9 % (FLUSH) 0.9 %
10 SYRINGE (ML) INJECTION EVERY 12 HOURS SCHEDULED
Status: DISCONTINUED | OUTPATIENT
Start: 2017-09-18 | End: 2017-09-26 | Stop reason: HOSPADM

## 2017-09-18 RX ORDER — POTASSIUM CHLORIDE 20MEQ/15ML
40 LIQUID (ML) ORAL PRN
Status: CANCELLED | OUTPATIENT
Start: 2017-09-18

## 2017-09-18 RX ORDER — SODIUM CHLORIDE 0.9 % (FLUSH) 0.9 %
10 SYRINGE (ML) INJECTION PRN
Status: DISCONTINUED | OUTPATIENT
Start: 2017-09-18 | End: 2017-09-26 | Stop reason: HOSPADM

## 2017-09-18 RX ORDER — SODIUM CHLORIDE 0.9 % (FLUSH) 0.9 %
10 SYRINGE (ML) INJECTION EVERY 12 HOURS SCHEDULED
Status: CANCELLED | OUTPATIENT
Start: 2017-09-18

## 2017-09-18 RX ORDER — POTASSIUM CHLORIDE 20 MEQ/1
40 TABLET, EXTENDED RELEASE ORAL PRN
Status: DISCONTINUED | OUTPATIENT
Start: 2017-09-18 | End: 2017-09-26 | Stop reason: HOSPADM

## 2017-09-18 RX ORDER — ONDANSETRON 2 MG/ML
4 INJECTION INTRAMUSCULAR; INTRAVENOUS EVERY 6 HOURS PRN
Status: DISCONTINUED | OUTPATIENT
Start: 2017-09-18 | End: 2017-09-26 | Stop reason: HOSPADM

## 2017-09-18 RX ADMIN — LEVETIRACETAM 500 MG: 100 INJECTION, SOLUTION INTRAVENOUS at 04:21

## 2017-09-18 RX ADMIN — DOCUSATE SODIUM 100 MG: 50 LIQUID ORAL at 10:06

## 2017-09-18 RX ADMIN — BARIUM SULFATE 80 ML: 400 SUSPENSION ORAL at 11:10

## 2017-09-18 RX ADMIN — Medication 10 ML: at 22:05

## 2017-09-18 RX ADMIN — Medication 10 ML: at 10:10

## 2017-09-18 RX ADMIN — PANTOPRAZOLE SODIUM 40 MG: 40 GRANULE, DELAYED RELEASE ORAL at 05:55

## 2017-09-18 RX ADMIN — METHOTREXATE SODIUM 2.5 MG: 2.5 TABLET ORAL at 10:06

## 2017-09-18 RX ADMIN — HYDROXYCHLOROQUINE SULFATE 200 MG: 200 TABLET, FILM COATED ORAL at 10:06

## 2017-09-18 RX ADMIN — LEVETIRACETAM 500 MG: 100 INJECTION, SOLUTION INTRAVENOUS at 15:56

## 2017-09-18 RX ADMIN — BARIUM SULFATE 50 ML: 0.81 POWDER, FOR SUSPENSION ORAL at 11:09

## 2017-09-18 ASSESSMENT — PAIN SCALES - GENERAL
PAINLEVEL_OUTOF10: 0

## 2017-09-19 PROBLEM — I48.91 ATRIAL FIBRILLATION (HCC): Status: ACTIVE | Noted: 2017-09-19

## 2017-09-19 LAB
ANION GAP SERPL CALCULATED.3IONS-SCNC: 16 MEQ/L (ref 7–13)
BUN BLDV-MCNC: 21 MG/DL (ref 8–23)
CALCIUM SERPL-MCNC: 8.3 MG/DL (ref 8.6–10.2)
CHLORIDE BLD-SCNC: 95 MEQ/L (ref 98–107)
CO2: 25 MEQ/L (ref 22–29)
CREAT SERPL-MCNC: 0.69 MG/DL (ref 0.5–0.9)
GFR AFRICAN AMERICAN: >60
GFR NON-AFRICAN AMERICAN: >60
GLUCOSE BLD-MCNC: 106 MG/DL (ref 74–109)
HCT VFR BLD CALC: 33 % (ref 37–47)
HEMOGLOBIN: 10.8 G/DL (ref 12–16)
MCH RBC QN AUTO: 30.9 PG (ref 27–31.3)
MCHC RBC AUTO-ENTMCNC: 32.7 % (ref 33–37)
MCV RBC AUTO: 94.5 FL (ref 82–100)
PDW BLD-RTO: 15.4 % (ref 11.5–14.5)
PLATELET # BLD: 255 K/UL (ref 130–400)
POTASSIUM SERPL-SCNC: 4.1 MEQ/L (ref 3.5–5.1)
RBC # BLD: 3.49 M/UL (ref 4.2–5.4)
SODIUM BLD-SCNC: 136 MEQ/L (ref 132–144)
WBC # BLD: 7.9 K/UL (ref 4.8–10.8)

## 2017-09-19 PROCEDURE — 80048 BASIC METABOLIC PNL TOTAL CA: CPT

## 2017-09-19 PROCEDURE — 97530 THERAPEUTIC ACTIVITIES: CPT

## 2017-09-19 PROCEDURE — 97166 OT EVAL MOD COMPLEX 45 MIN: CPT

## 2017-09-19 PROCEDURE — 36415 COLL VENOUS BLD VENIPUNCTURE: CPT

## 2017-09-19 PROCEDURE — 1180000000 HC REHAB R&B

## 2017-09-19 PROCEDURE — 99223 1ST HOSP IP/OBS HIGH 75: CPT | Performed by: PHYSICAL MEDICINE & REHABILITATION

## 2017-09-19 PROCEDURE — 6370000000 HC RX 637 (ALT 250 FOR IP): Performed by: PSYCHIATRY & NEUROLOGY

## 2017-09-19 PROCEDURE — 85027 COMPLETE CBC AUTOMATED: CPT

## 2017-09-19 PROCEDURE — 92610 EVALUATE SWALLOWING FUNCTION: CPT

## 2017-09-19 PROCEDURE — 6360000002 HC RX W HCPCS: Performed by: HOSPITALIST

## 2017-09-19 PROCEDURE — 2580000003 HC RX 258: Performed by: HOSPITALIST

## 2017-09-19 PROCEDURE — 97161 PT EVAL LOW COMPLEX 20 MIN: CPT

## 2017-09-19 PROCEDURE — 92523 SPEECH SOUND LANG COMPREHEN: CPT

## 2017-09-19 PROCEDURE — 6370000000 HC RX 637 (ALT 250 FOR IP): Performed by: HOSPITALIST

## 2017-09-19 RX ORDER — LEVETIRACETAM 500 MG/1
500 TABLET ORAL 2 TIMES DAILY
Status: DISCONTINUED | OUTPATIENT
Start: 2017-09-19 | End: 2017-09-26 | Stop reason: HOSPADM

## 2017-09-19 RX ADMIN — DOCUSATE SODIUM 100 MG: 50 LIQUID ORAL at 08:39

## 2017-09-19 RX ADMIN — PANTOPRAZOLE SODIUM 40 MG: 40 GRANULE, DELAYED RELEASE ORAL at 05:59

## 2017-09-19 RX ADMIN — Medication 10 ML: at 13:53

## 2017-09-19 RX ADMIN — LEVETIRACETAM 500 MG: 100 INJECTION, SOLUTION INTRAVENOUS at 05:59

## 2017-09-19 RX ADMIN — HYDROXYCHLOROQUINE SULFATE 200 MG: 200 TABLET, FILM COATED ORAL at 08:38

## 2017-09-19 RX ADMIN — DOCUSATE SODIUM 100 MG: 50 LIQUID ORAL at 19:13

## 2017-09-19 RX ADMIN — Medication 10 ML: at 19:14

## 2017-09-19 RX ADMIN — LEVETIRACETAM 500 MG: 500 TABLET ORAL at 19:13

## 2017-09-19 ASSESSMENT — ENCOUNTER SYMPTOMS
NAUSEA: 1
ABDOMINAL DISTENTION: 0
PHOTOPHOBIA: 0
FACIAL SWELLING: 0
CONSTIPATION: 0
CHEST TIGHTNESS: 0
WHEEZING: 0
BACK PAIN: 1
COLOR CHANGE: 0
VOMITING: 0
COUGH: 0
ANAL BLEEDING: 0
TROUBLE SWALLOWING: 0
BLOOD IN STOOL: 0
ABDOMINAL PAIN: 0
CHOKING: 0
EYE PAIN: 0
EYE REDNESS: 0
SHORTNESS OF BREATH: 0

## 2017-09-19 ASSESSMENT — PAIN SCALES - GENERAL
PAINLEVEL_OUTOF10: 0

## 2017-09-20 LAB
BACTERIA: ABNORMAL /HPF
BILIRUBIN URINE: NEGATIVE
BLOOD, URINE: ABNORMAL
CLARITY: ABNORMAL
COLOR: YELLOW
EPITHELIAL CELLS, UA: ABNORMAL /HPF
GLUCOSE URINE: NEGATIVE MG/DL
KETONES, URINE: NEGATIVE MG/DL
LEUKOCYTE ESTERASE, URINE: ABNORMAL
NITRITE, URINE: NEGATIVE
PH UA: 6.5 (ref 5–9)
PROTEIN UA: 30 MG/DL
RBC UA: ABNORMAL /HPF (ref 0–2)
SPECIFIC GRAVITY UA: 1.02 (ref 1–1.03)
URINE REFLEX TO CULTURE: YES
UROBILINOGEN, URINE: 1 E.U./DL
WBC UA: >100 /HPF (ref 0–5)

## 2017-09-20 PROCEDURE — 97535 SELF CARE MNGMENT TRAINING: CPT

## 2017-09-20 PROCEDURE — 87086 URINE CULTURE/COLONY COUNT: CPT

## 2017-09-20 PROCEDURE — 1180000000 HC REHAB R&B

## 2017-09-20 PROCEDURE — 6370000000 HC RX 637 (ALT 250 FOR IP): Performed by: PSYCHIATRY & NEUROLOGY

## 2017-09-20 PROCEDURE — 87186 SC STD MICRODIL/AGAR DIL: CPT

## 2017-09-20 PROCEDURE — 6370000000 HC RX 637 (ALT 250 FOR IP): Performed by: HOSPITALIST

## 2017-09-20 PROCEDURE — 97112 NEUROMUSCULAR REEDUCATION: CPT

## 2017-09-20 PROCEDURE — 2580000003 HC RX 258: Performed by: HOSPITALIST

## 2017-09-20 PROCEDURE — 97150 GROUP THERAPEUTIC PROCEDURES: CPT

## 2017-09-20 PROCEDURE — 6370000000 HC RX 637 (ALT 250 FOR IP): Performed by: PHYSICAL MEDICINE & REHABILITATION

## 2017-09-20 PROCEDURE — 81001 URINALYSIS AUTO W/SCOPE: CPT

## 2017-09-20 PROCEDURE — 97530 THERAPEUTIC ACTIVITIES: CPT

## 2017-09-20 PROCEDURE — 99232 SBSQ HOSP IP/OBS MODERATE 35: CPT | Performed by: PHYSICAL MEDICINE & REHABILITATION

## 2017-09-20 PROCEDURE — 97116 GAIT TRAINING THERAPY: CPT

## 2017-09-20 PROCEDURE — 87077 CULTURE AEROBIC IDENTIFY: CPT

## 2017-09-20 RX ORDER — CIPROFLOXACIN 250 MG/1
250 TABLET, FILM COATED ORAL EVERY 12 HOURS SCHEDULED
Status: DISCONTINUED | OUTPATIENT
Start: 2017-09-20 | End: 2017-09-22

## 2017-09-20 RX ADMIN — LEVETIRACETAM 500 MG: 500 TABLET ORAL at 20:52

## 2017-09-20 RX ADMIN — DOCUSATE SODIUM 100 MG: 50 LIQUID ORAL at 20:52

## 2017-09-20 RX ADMIN — LEVETIRACETAM 500 MG: 500 TABLET ORAL at 08:17

## 2017-09-20 RX ADMIN — Medication 10 ML: at 20:52

## 2017-09-20 RX ADMIN — HYDROXYCHLOROQUINE SULFATE 200 MG: 200 TABLET, FILM COATED ORAL at 08:17

## 2017-09-20 RX ADMIN — DOCUSATE SODIUM 100 MG: 50 LIQUID ORAL at 08:17

## 2017-09-20 RX ADMIN — CIPROFLOXACIN HYDROCHLORIDE 250 MG: 250 TABLET, FILM COATED ORAL at 20:52

## 2017-09-20 RX ADMIN — PANTOPRAZOLE SODIUM 40 MG: 40 GRANULE, DELAYED RELEASE ORAL at 06:13

## 2017-09-20 ASSESSMENT — PAIN SCALES - GENERAL
PAINLEVEL_OUTOF10: 0
PAINLEVEL_OUTOF10: 0

## 2017-09-21 PROCEDURE — 92526 ORAL FUNCTION THERAPY: CPT

## 2017-09-21 PROCEDURE — 92523 SPEECH SOUND LANG COMPREHEN: CPT

## 2017-09-21 PROCEDURE — 6370000000 HC RX 637 (ALT 250 FOR IP): Performed by: PSYCHIATRY & NEUROLOGY

## 2017-09-21 PROCEDURE — 97150 GROUP THERAPEUTIC PROCEDURES: CPT

## 2017-09-21 PROCEDURE — 6370000000 HC RX 637 (ALT 250 FOR IP): Performed by: PHYSICAL MEDICINE & REHABILITATION

## 2017-09-21 PROCEDURE — 6370000000 HC RX 637 (ALT 250 FOR IP): Performed by: HOSPITALIST

## 2017-09-21 PROCEDURE — 1180000000 HC REHAB R&B

## 2017-09-21 PROCEDURE — 99233 SBSQ HOSP IP/OBS HIGH 50: CPT | Performed by: PHYSICAL MEDICINE & REHABILITATION

## 2017-09-21 PROCEDURE — 97532 HC OT DEVELOP COGNITIVE SKILLS 15MIN: CPT

## 2017-09-21 PROCEDURE — 97112 NEUROMUSCULAR REEDUCATION: CPT

## 2017-09-21 PROCEDURE — 2580000003 HC RX 258: Performed by: HOSPITALIST

## 2017-09-21 PROCEDURE — 97116 GAIT TRAINING THERAPY: CPT

## 2017-09-21 RX ADMIN — CIPROFLOXACIN HYDROCHLORIDE 250 MG: 250 TABLET, FILM COATED ORAL at 08:59

## 2017-09-21 RX ADMIN — DOCUSATE SODIUM 100 MG: 50 LIQUID ORAL at 19:47

## 2017-09-21 RX ADMIN — CIPROFLOXACIN HYDROCHLORIDE 250 MG: 250 TABLET, FILM COATED ORAL at 19:47

## 2017-09-21 RX ADMIN — LEVETIRACETAM 500 MG: 500 TABLET ORAL at 08:59

## 2017-09-21 RX ADMIN — Medication 10 ML: at 12:37

## 2017-09-21 RX ADMIN — PANTOPRAZOLE SODIUM 40 MG: 40 GRANULE, DELAYED RELEASE ORAL at 06:02

## 2017-09-21 RX ADMIN — Medication 10 ML: at 19:51

## 2017-09-21 RX ADMIN — DOCUSATE SODIUM 100 MG: 50 LIQUID ORAL at 08:59

## 2017-09-21 RX ADMIN — LEVETIRACETAM 500 MG: 500 TABLET ORAL at 19:47

## 2017-09-21 ASSESSMENT — PAIN SCALES - GENERAL
PAINLEVEL_OUTOF10: 0

## 2017-09-22 LAB
ORGANISM: ABNORMAL
URINE CULTURE, ROUTINE: ABNORMAL

## 2017-09-22 PROCEDURE — 6370000000 HC RX 637 (ALT 250 FOR IP): Performed by: PSYCHIATRY & NEUROLOGY

## 2017-09-22 PROCEDURE — 97530 THERAPEUTIC ACTIVITIES: CPT

## 2017-09-22 PROCEDURE — 6370000000 HC RX 637 (ALT 250 FOR IP): Performed by: PHYSICAL MEDICINE & REHABILITATION

## 2017-09-22 PROCEDURE — 1180000000 HC REHAB R&B

## 2017-09-22 PROCEDURE — 6370000000 HC RX 637 (ALT 250 FOR IP): Performed by: HOSPITALIST

## 2017-09-22 PROCEDURE — 99232 SBSQ HOSP IP/OBS MODERATE 35: CPT | Performed by: PHYSICAL MEDICINE & REHABILITATION

## 2017-09-22 PROCEDURE — 97535 SELF CARE MNGMENT TRAINING: CPT

## 2017-09-22 PROCEDURE — 97116 GAIT TRAINING THERAPY: CPT

## 2017-09-22 PROCEDURE — 2580000003 HC RX 258: Performed by: HOSPITALIST

## 2017-09-22 RX ORDER — SULFAMETHOXAZOLE AND TRIMETHOPRIM 800; 160 MG/1; MG/1
1 TABLET ORAL EVERY 12 HOURS SCHEDULED
Status: DISCONTINUED | OUTPATIENT
Start: 2017-09-22 | End: 2017-09-26 | Stop reason: HOSPADM

## 2017-09-22 RX ADMIN — Medication 10 ML: at 21:20

## 2017-09-22 RX ADMIN — CIPROFLOXACIN HYDROCHLORIDE 250 MG: 250 TABLET, FILM COATED ORAL at 10:45

## 2017-09-22 RX ADMIN — PANTOPRAZOLE SODIUM 40 MG: 40 GRANULE, DELAYED RELEASE ORAL at 06:18

## 2017-09-22 RX ADMIN — LEVETIRACETAM 500 MG: 500 TABLET ORAL at 21:20

## 2017-09-22 RX ADMIN — SULFAMETHOXAZOLE AND TRIMETHOPRIM 1 TABLET: 800; 160 TABLET ORAL at 12:04

## 2017-09-22 RX ADMIN — SULFAMETHOXAZOLE AND TRIMETHOPRIM 1 TABLET: 800; 160 TABLET ORAL at 21:20

## 2017-09-22 RX ADMIN — DOCUSATE SODIUM 100 MG: 50 LIQUID ORAL at 21:20

## 2017-09-22 RX ADMIN — LEVETIRACETAM 500 MG: 500 TABLET ORAL at 10:45

## 2017-09-22 ASSESSMENT — PAIN SCALES - GENERAL
PAINLEVEL_OUTOF10: 0

## 2017-09-23 PROCEDURE — 6370000000 HC RX 637 (ALT 250 FOR IP): Performed by: HOSPITALIST

## 2017-09-23 PROCEDURE — 97112 NEUROMUSCULAR REEDUCATION: CPT

## 2017-09-23 PROCEDURE — 97110 THERAPEUTIC EXERCISES: CPT

## 2017-09-23 PROCEDURE — 99232 SBSQ HOSP IP/OBS MODERATE 35: CPT | Performed by: PHYSICAL MEDICINE & REHABILITATION

## 2017-09-23 PROCEDURE — 1180000000 HC REHAB R&B

## 2017-09-23 PROCEDURE — 97530 THERAPEUTIC ACTIVITIES: CPT

## 2017-09-23 PROCEDURE — 6370000000 HC RX 637 (ALT 250 FOR IP): Performed by: PSYCHIATRY & NEUROLOGY

## 2017-09-23 PROCEDURE — 6370000000 HC RX 637 (ALT 250 FOR IP): Performed by: PHYSICAL MEDICINE & REHABILITATION

## 2017-09-23 PROCEDURE — 97116 GAIT TRAINING THERAPY: CPT

## 2017-09-23 RX ADMIN — SULFAMETHOXAZOLE AND TRIMETHOPRIM 1 TABLET: 800; 160 TABLET ORAL at 09:53

## 2017-09-23 RX ADMIN — LEVETIRACETAM 500 MG: 500 TABLET ORAL at 09:53

## 2017-09-23 RX ADMIN — DOCUSATE SODIUM 100 MG: 50 LIQUID ORAL at 20:38

## 2017-09-23 RX ADMIN — LEVETIRACETAM 500 MG: 500 TABLET ORAL at 20:38

## 2017-09-23 RX ADMIN — PANTOPRAZOLE SODIUM 40 MG: 40 GRANULE, DELAYED RELEASE ORAL at 05:54

## 2017-09-23 RX ADMIN — DOCUSATE SODIUM 100 MG: 50 LIQUID ORAL at 09:53

## 2017-09-23 RX ADMIN — SULFAMETHOXAZOLE AND TRIMETHOPRIM 1 TABLET: 800; 160 TABLET ORAL at 20:38

## 2017-09-23 ASSESSMENT — PAIN SCALES - GENERAL
PAINLEVEL_OUTOF10: 0

## 2017-09-24 PROCEDURE — 99231 SBSQ HOSP IP/OBS SF/LOW 25: CPT | Performed by: PHYSICAL MEDICINE & REHABILITATION

## 2017-09-24 PROCEDURE — 6370000000 HC RX 637 (ALT 250 FOR IP): Performed by: PHYSICAL MEDICINE & REHABILITATION

## 2017-09-24 PROCEDURE — 1180000000 HC REHAB R&B

## 2017-09-24 PROCEDURE — 6370000000 HC RX 637 (ALT 250 FOR IP): Performed by: PSYCHIATRY & NEUROLOGY

## 2017-09-24 PROCEDURE — 6370000000 HC RX 637 (ALT 250 FOR IP): Performed by: HOSPITALIST

## 2017-09-24 RX ADMIN — PANTOPRAZOLE SODIUM 40 MG: 40 GRANULE, DELAYED RELEASE ORAL at 05:57

## 2017-09-24 RX ADMIN — LEVETIRACETAM 500 MG: 500 TABLET ORAL at 10:18

## 2017-09-24 RX ADMIN — SULFAMETHOXAZOLE AND TRIMETHOPRIM 1 TABLET: 800; 160 TABLET ORAL at 19:52

## 2017-09-24 RX ADMIN — DOCUSATE SODIUM 100 MG: 50 LIQUID ORAL at 19:52

## 2017-09-24 RX ADMIN — SULFAMETHOXAZOLE AND TRIMETHOPRIM 1 TABLET: 800; 160 TABLET ORAL at 10:19

## 2017-09-24 RX ADMIN — DOCUSATE SODIUM 100 MG: 50 LIQUID ORAL at 10:18

## 2017-09-24 RX ADMIN — LEVETIRACETAM 500 MG: 500 TABLET ORAL at 19:52

## 2017-09-24 ASSESSMENT — PAIN SCALES - GENERAL
PAINLEVEL_OUTOF10: 0
PAINLEVEL_OUTOF10: 0

## 2017-09-25 PROCEDURE — 97535 SELF CARE MNGMENT TRAINING: CPT

## 2017-09-25 PROCEDURE — 6370000000 HC RX 637 (ALT 250 FOR IP): Performed by: PHYSICAL MEDICINE & REHABILITATION

## 2017-09-25 PROCEDURE — 97116 GAIT TRAINING THERAPY: CPT

## 2017-09-25 PROCEDURE — 97532 HC OT DEVELOP COGNITIVE SKILLS 15MIN: CPT

## 2017-09-25 PROCEDURE — 99231 SBSQ HOSP IP/OBS SF/LOW 25: CPT | Performed by: PHYSICAL MEDICINE & REHABILITATION

## 2017-09-25 PROCEDURE — 6370000000 HC RX 637 (ALT 250 FOR IP): Performed by: HOSPITALIST

## 2017-09-25 PROCEDURE — 1180000000 HC REHAB R&B

## 2017-09-25 PROCEDURE — 97112 NEUROMUSCULAR REEDUCATION: CPT

## 2017-09-25 PROCEDURE — 6370000000 HC RX 637 (ALT 250 FOR IP): Performed by: PSYCHIATRY & NEUROLOGY

## 2017-09-25 PROCEDURE — 97530 THERAPEUTIC ACTIVITIES: CPT

## 2017-09-25 RX ADMIN — DOCUSATE SODIUM 100 MG: 50 LIQUID ORAL at 20:24

## 2017-09-25 RX ADMIN — SULFAMETHOXAZOLE AND TRIMETHOPRIM 1 TABLET: 800; 160 TABLET ORAL at 07:48

## 2017-09-25 RX ADMIN — LEVETIRACETAM 500 MG: 500 TABLET ORAL at 20:23

## 2017-09-25 RX ADMIN — SULFAMETHOXAZOLE AND TRIMETHOPRIM 1 TABLET: 800; 160 TABLET ORAL at 20:24

## 2017-09-25 RX ADMIN — PANTOPRAZOLE SODIUM 40 MG: 40 GRANULE, DELAYED RELEASE ORAL at 06:21

## 2017-09-25 RX ADMIN — DOCUSATE SODIUM 100 MG: 50 LIQUID ORAL at 11:52

## 2017-09-25 RX ADMIN — LEVETIRACETAM 500 MG: 500 TABLET ORAL at 07:48

## 2017-09-25 ASSESSMENT — PAIN DESCRIPTION - DESCRIPTORS: DESCRIPTORS: ACHING;PRESSURE

## 2017-09-25 ASSESSMENT — PAIN DESCRIPTION - ORIENTATION
ORIENTATION: RIGHT
ORIENTATION: RIGHT

## 2017-09-25 ASSESSMENT — PAIN SCALES - GENERAL
PAINLEVEL_OUTOF10: 0
PAINLEVEL_OUTOF10: 4
PAINLEVEL_OUTOF10: 0
PAINLEVEL_OUTOF10: 4

## 2017-09-25 ASSESSMENT — PAIN DESCRIPTION - LOCATION
LOCATION: FLANK
LOCATION: FLANK

## 2017-09-25 ASSESSMENT — PAIN DESCRIPTION - PAIN TYPE
TYPE: ACUTE PAIN

## 2017-09-25 ASSESSMENT — PAIN DESCRIPTION - FREQUENCY: FREQUENCY: INTERMITTENT

## 2017-09-26 VITALS
SYSTOLIC BLOOD PRESSURE: 135 MMHG | WEIGHT: 108.91 LBS | RESPIRATION RATE: 18 BRPM | HEIGHT: 64 IN | DIASTOLIC BLOOD PRESSURE: 63 MMHG | HEART RATE: 69 BPM | OXYGEN SATURATION: 99 % | BODY MASS INDEX: 18.59 KG/M2 | TEMPERATURE: 98 F

## 2017-09-26 PROCEDURE — 6370000000 HC RX 637 (ALT 250 FOR IP): Performed by: PHYSICAL MEDICINE & REHABILITATION

## 2017-09-26 PROCEDURE — 6370000000 HC RX 637 (ALT 250 FOR IP): Performed by: PSYCHIATRY & NEUROLOGY

## 2017-09-26 PROCEDURE — 99238 HOSP IP/OBS DSCHRG MGMT 30/<: CPT | Performed by: PHYSICAL MEDICINE & REHABILITATION

## 2017-09-26 PROCEDURE — 6370000000 HC RX 637 (ALT 250 FOR IP): Performed by: HOSPITALIST

## 2017-09-26 RX ORDER — LEVETIRACETAM 500 MG/1
500 TABLET ORAL 2 TIMES DAILY
Qty: 60 TABLET | Refills: 3 | Status: SHIPPED | OUTPATIENT
Start: 2017-09-26 | End: 2018-03-02 | Stop reason: CLARIF

## 2017-09-26 RX ORDER — SULFAMETHOXAZOLE AND TRIMETHOPRIM 800; 160 MG/1; MG/1
1 TABLET ORAL EVERY 12 HOURS SCHEDULED
Qty: 14 TABLET | Refills: 0 | Status: ON HOLD | OUTPATIENT
Start: 2017-09-26 | End: 2017-10-05 | Stop reason: HOSPADM

## 2017-09-26 RX ADMIN — LEVETIRACETAM 500 MG: 500 TABLET ORAL at 10:05

## 2017-09-26 RX ADMIN — PANTOPRAZOLE SODIUM 40 MG: 40 GRANULE, DELAYED RELEASE ORAL at 05:58

## 2017-09-26 RX ADMIN — DOCUSATE SODIUM 100 MG: 50 LIQUID ORAL at 10:05

## 2017-09-26 RX ADMIN — SULFAMETHOXAZOLE AND TRIMETHOPRIM 1 TABLET: 800; 160 TABLET ORAL at 10:05

## 2017-09-26 ASSESSMENT — PAIN SCALES - GENERAL
PAINLEVEL_OUTOF10: 0
PAINLEVEL_OUTOF10: 0

## 2017-09-27 ENCOUNTER — TELEPHONE (OUTPATIENT)
Dept: PHYSICAL MEDICINE AND REHAB | Age: 82
End: 2017-09-27

## 2017-09-27 LAB
EKG ATRIAL RATE: 117 BPM
EKG ATRIAL RATE: 68 BPM
EKG ATRIAL RATE: 92 BPM
EKG P AXIS: 12 DEGREES
EKG P AXIS: 48 DEGREES
EKG P-R INTERVAL: 136 MS
EKG P-R INTERVAL: 152 MS
EKG Q-T INTERVAL: 352 MS
EKG Q-T INTERVAL: 362 MS
EKG Q-T INTERVAL: 432 MS
EKG QRS DURATION: 84 MS
EKG QRS DURATION: 84 MS
EKG QRS DURATION: 88 MS
EKG QTC CALCULATION (BAZETT): 447 MS
EKG QTC CALCULATION (BAZETT): 459 MS
EKG QTC CALCULATION (BAZETT): 491 MS
EKG R AXIS: 68 DEGREES
EKG R AXIS: 69 DEGREES
EKG R AXIS: 73 DEGREES
EKG T AXIS: 17 DEGREES
EKG T AXIS: 42 DEGREES
EKG T AXIS: 49 DEGREES
EKG VENTRICULAR RATE: 117 BPM
EKG VENTRICULAR RATE: 68 BPM
EKG VENTRICULAR RATE: 92 BPM

## 2017-10-03 ENCOUNTER — APPOINTMENT (OUTPATIENT)
Dept: GENERAL RADIOLOGY | Age: 82
DRG: 641 | End: 2017-10-03
Payer: MEDICARE

## 2017-10-03 ENCOUNTER — HOSPITAL ENCOUNTER (INPATIENT)
Age: 82
LOS: 2 days | Discharge: HOME HEALTH | DRG: 641 | End: 2017-10-05
Attending: INTERNAL MEDICINE | Admitting: INTERNAL MEDICINE
Payer: MEDICARE

## 2017-10-03 DIAGNOSIS — E86.0 DEHYDRATION: ICD-10-CM

## 2017-10-03 DIAGNOSIS — E87.1 HYPONATREMIA: Primary | ICD-10-CM

## 2017-10-03 DIAGNOSIS — E87.5 HYPERKALEMIA: ICD-10-CM

## 2017-10-03 PROBLEM — I21.4 NSTEMI (NON-ST ELEVATED MYOCARDIAL INFARCTION) (HCC): Status: RESOLVED | Noted: 2017-09-12 | Resolved: 2017-10-03

## 2017-10-03 PROBLEM — J90 PLEURAL EFFUSION: Status: RESOLVED | Noted: 2017-09-11 | Resolved: 2017-10-03

## 2017-10-03 PROBLEM — R64 CACHEXIA (HCC): Status: RESOLVED | Noted: 2017-09-18 | Resolved: 2017-10-03

## 2017-10-03 PROBLEM — I16.0 HYPERTENSIVE URGENCY: Status: RESOLVED | Noted: 2017-09-11 | Resolved: 2017-10-03

## 2017-10-03 LAB
ABO/RH: NORMAL
ALBUMIN SERPL-MCNC: 3.3 G/DL (ref 3.9–4.9)
ALP BLD-CCNC: 76 U/L (ref 40–130)
ALT SERPL-CCNC: 14 U/L (ref 0–33)
ANION GAP SERPL CALCULATED.3IONS-SCNC: 14 MEQ/L (ref 7–13)
ANTIBODY SCREEN: NORMAL
AST SERPL-CCNC: 22 U/L (ref 0–35)
BASOPHILS ABSOLUTE: 0 K/UL (ref 0–0.2)
BASOPHILS RELATIVE PERCENT: 0.5 %
BILIRUB SERPL-MCNC: 0.3 MG/DL (ref 0–1.2)
BUN BLDV-MCNC: 25 MG/DL (ref 8–23)
CALCIUM SERPL-MCNC: 8.5 MG/DL (ref 8.6–10.2)
CHLORIDE BLD-SCNC: 90 MEQ/L (ref 98–107)
CO2: 21 MEQ/L (ref 22–29)
CREAT SERPL-MCNC: 0.83 MG/DL (ref 0.5–0.9)
EOSINOPHILS ABSOLUTE: 0.1 K/UL (ref 0–0.7)
EOSINOPHILS RELATIVE PERCENT: 1 %
GFR AFRICAN AMERICAN: >60
GFR NON-AFRICAN AMERICAN: >60
GLOBULIN: 3.4 G/DL (ref 2.3–3.5)
GLUCOSE BLD-MCNC: 104 MG/DL (ref 74–109)
HCT VFR BLD CALC: 31 % (ref 37–47)
HEMOGLOBIN: 10.2 G/DL (ref 12–16)
LACTIC ACID: 1.2 MMOL/L (ref 0.5–2.2)
LIPASE: 50 U/L (ref 13–60)
LYMPHOCYTES ABSOLUTE: 1.1 K/UL (ref 1–4.8)
LYMPHOCYTES RELATIVE PERCENT: 12.2 %
MAGNESIUM: 2 MG/DL (ref 1.7–2.3)
MCH RBC QN AUTO: 30.5 PG (ref 27–31.3)
MCHC RBC AUTO-ENTMCNC: 33 % (ref 33–37)
MCV RBC AUTO: 92.7 FL (ref 82–100)
MONOCYTES ABSOLUTE: 1 K/UL (ref 0.2–0.8)
MONOCYTES RELATIVE PERCENT: 11.8 %
NEUTROPHILS ABSOLUTE: 6.5 K/UL (ref 1.4–6.5)
NEUTROPHILS RELATIVE PERCENT: 74.5 %
PDW BLD-RTO: 15.4 % (ref 11.5–14.5)
PLATELET # BLD: 273 K/UL (ref 130–400)
POTASSIUM SERPL-SCNC: 5.4 MEQ/L (ref 3.5–5.1)
RBC # BLD: 3.35 M/UL (ref 4.2–5.4)
SODIUM BLD-SCNC: 125 MEQ/L (ref 132–144)
TOTAL CK: 58 U/L (ref 0–170)
TOTAL PROTEIN: 6.7 G/DL (ref 6.4–8.1)
TROPONIN: <0.01 NG/ML (ref 0–0.01)
WBC # BLD: 8.7 K/UL (ref 4.8–10.8)

## 2017-10-03 PROCEDURE — 83690 ASSAY OF LIPASE: CPT

## 2017-10-03 PROCEDURE — 86900 BLOOD TYPING SEROLOGIC ABO: CPT

## 2017-10-03 PROCEDURE — 83735 ASSAY OF MAGNESIUM: CPT

## 2017-10-03 PROCEDURE — 80053 COMPREHEN METABOLIC PANEL: CPT

## 2017-10-03 PROCEDURE — 85025 COMPLETE CBC W/AUTO DIFF WBC: CPT

## 2017-10-03 PROCEDURE — 6370000000 HC RX 637 (ALT 250 FOR IP): Performed by: INTERNAL MEDICINE

## 2017-10-03 PROCEDURE — 99284 EMERGENCY DEPT VISIT MOD MDM: CPT

## 2017-10-03 PROCEDURE — 71010 XR CHEST PORTABLE: CPT

## 2017-10-03 PROCEDURE — 84484 ASSAY OF TROPONIN QUANT: CPT

## 2017-10-03 PROCEDURE — 6370000000 HC RX 637 (ALT 250 FOR IP): Performed by: PHYSICIAN ASSISTANT

## 2017-10-03 PROCEDURE — 83605 ASSAY OF LACTIC ACID: CPT

## 2017-10-03 PROCEDURE — 96360 HYDRATION IV INFUSION INIT: CPT

## 2017-10-03 PROCEDURE — 2580000003 HC RX 258: Performed by: PHYSICIAN ASSISTANT

## 2017-10-03 PROCEDURE — 1210000000 HC MED SURG R&B

## 2017-10-03 PROCEDURE — 93005 ELECTROCARDIOGRAM TRACING: CPT

## 2017-10-03 PROCEDURE — 86850 RBC ANTIBODY SCREEN: CPT

## 2017-10-03 PROCEDURE — 82550 ASSAY OF CK (CPK): CPT

## 2017-10-03 PROCEDURE — 36415 COLL VENOUS BLD VENIPUNCTURE: CPT

## 2017-10-03 PROCEDURE — 86901 BLOOD TYPING SEROLOGIC RH(D): CPT

## 2017-10-03 RX ORDER — ONDANSETRON 2 MG/ML
4 INJECTION INTRAMUSCULAR; INTRAVENOUS EVERY 6 HOURS PRN
Status: DISCONTINUED | OUTPATIENT
Start: 2017-10-03 | End: 2017-10-05 | Stop reason: HOSPADM

## 2017-10-03 RX ORDER — DOCUSATE SODIUM 100 MG/1
100 CAPSULE, LIQUID FILLED ORAL 2 TIMES DAILY
Status: DISCONTINUED | OUTPATIENT
Start: 2017-10-03 | End: 2017-10-05 | Stop reason: HOSPADM

## 2017-10-03 RX ORDER — LEVETIRACETAM 500 MG/1
500 TABLET ORAL 2 TIMES DAILY
Status: DISCONTINUED | OUTPATIENT
Start: 2017-10-03 | End: 2017-10-05 | Stop reason: HOSPADM

## 2017-10-03 RX ORDER — ACETAMINOPHEN 325 MG/1
650 TABLET ORAL EVERY 4 HOURS PRN
Status: DISCONTINUED | OUTPATIENT
Start: 2017-10-03 | End: 2017-10-05 | Stop reason: HOSPADM

## 2017-10-03 RX ORDER — HYDROXYCHLOROQUINE SULFATE 200 MG/1
200 TABLET, FILM COATED ORAL DAILY
Status: DISCONTINUED | OUTPATIENT
Start: 2017-10-04 | End: 2017-10-05 | Stop reason: HOSPADM

## 2017-10-03 RX ORDER — FOLIC ACID 1 MG/1
1 TABLET ORAL DAILY
Status: DISCONTINUED | OUTPATIENT
Start: 2017-10-03 | End: 2017-10-05 | Stop reason: HOSPADM

## 2017-10-03 RX ORDER — PANTOPRAZOLE SODIUM 40 MG/1
40 GRANULE, DELAYED RELEASE ORAL
Status: DISCONTINUED | OUTPATIENT
Start: 2017-10-04 | End: 2017-10-05 | Stop reason: HOSPADM

## 2017-10-03 RX ORDER — SODIUM CHLORIDE 9 MG/ML
INJECTION, SOLUTION INTRAVENOUS CONTINUOUS
Status: DISCONTINUED | OUTPATIENT
Start: 2017-10-03 | End: 2017-10-05 | Stop reason: HOSPADM

## 2017-10-03 RX ORDER — 0.9 % SODIUM CHLORIDE 0.9 %
1000 INTRAVENOUS SOLUTION INTRAVENOUS ONCE
Status: COMPLETED | OUTPATIENT
Start: 2017-10-03 | End: 2017-10-03

## 2017-10-03 RX ORDER — SODIUM POLYSTYRENE SULFONATE 15 G/60ML
15 SUSPENSION ORAL; RECTAL ONCE
Status: COMPLETED | OUTPATIENT
Start: 2017-10-03 | End: 2017-10-03

## 2017-10-03 RX ADMIN — SODIUM CHLORIDE 1000 ML: 9 INJECTION, SOLUTION INTRAVENOUS at 20:32

## 2017-10-03 RX ADMIN — DOCUSATE SODIUM 100 MG: 100 CAPSULE, LIQUID FILLED ORAL at 23:05

## 2017-10-03 RX ADMIN — SODIUM CHLORIDE: 9 INJECTION, SOLUTION INTRAVENOUS at 23:04

## 2017-10-03 RX ADMIN — LEVETIRACETAM 500 MG: 500 TABLET ORAL at 23:05

## 2017-10-03 RX ADMIN — SODIUM POLYSTYRENE SULFONATE 15 G: 15 SUSPENSION ORAL; RECTAL at 21:57

## 2017-10-03 ASSESSMENT — ENCOUNTER SYMPTOMS
ALLERGIC/IMMUNOLOGIC NEGATIVE: 1
APNEA: 0
EYE PAIN: 0
COUGH: 0
SHORTNESS OF BREATH: 0
ABDOMINAL PAIN: 0
SINUS PRESSURE: 0
COLOR CHANGE: 0
TROUBLE SWALLOWING: 0

## 2017-10-03 ASSESSMENT — PAIN SCALES - GENERAL: PAINLEVEL_OUTOF10: 6

## 2017-10-03 ASSESSMENT — PAIN DESCRIPTION - LOCATION: LOCATION: SHOULDER

## 2017-10-03 ASSESSMENT — PAIN DESCRIPTION - PAIN TYPE: TYPE: CHRONIC PAIN

## 2017-10-03 ASSESSMENT — PAIN DESCRIPTION - ORIENTATION: ORIENTATION: LEFT

## 2017-10-03 ASSESSMENT — PAIN DESCRIPTION - DESCRIPTORS: DESCRIPTORS: CONSTANT;ACHING

## 2017-10-03 NOTE — ED TRIAGE NOTES
Pt arrived to ED sent by family doctor for abnormal blood work and confusion. VSS. Resp even and unlabored. + peripheral pulses.

## 2017-10-03 NOTE — IP AVS SNAPSHOT
Patient Information     [facility]     [facility] 3/25/1921         This is your updated medication list to keep with you all times      TAKE these medications     ferrous sulfate 325 (65 Fe) MG EC tablet   Commonly known as:  FE TABS   Take 1 tablet by mouth daily       folic acid 1 MG tablet   Commonly known as:  FOLVITE       hydroxychloroquine 200 MG tablet   Commonly known as:  PLAQUENIL       levETIRAcetam 500 MG tablet   Commonly known as:  KEPPRA   Take 1 tablet by mouth 2 times daily       methotrexate 2.5 MG chemo tablet   Commonly known as:  RHEUMATREX       pantoprazole sodium 40 MG Pack packet   Commonly known as:  PROTONIX

## 2017-10-03 NOTE — IP AVS SNAPSHOT
sulfamethoxazole-trimethoprim 800-160 MG per tablet   Commonly known as:  BACTRIM DS;SEPTRA DS            Where to Get Your Medications      These medications were sent to [address] [place] [address] [address] - [facility], [address] [facility]  [address] [facility] [address]     Phone:  [phone]     ferrous sulfate 325 (65 Fe) MG EC tablet               Allergies as of 10/5/2017        Reactions    Alendronate Other (See Comments)    Aspirin       Immunizations as of 10/5/2017     No immunizations on file. Last Vitals          Most Recent Value    Temp  97.3 °F (36.3 °C)    Pulse  63    Resp  18    BP  (!)  151/50         After Visit Summary    This summary was created for you. Thank you for entrusting your care to us. The following information includes details about your hospital/visit stay along with steps you should take to help with your recovery once you leave the hospital.  In this packet, you will find information about the topics listed below:    · Instructions about your medications including a list of your home medications  · A summary of your hospital visit  · Follow-up appointments once you have left the hospital  · Your care plan at home      You may receive a survey regarding the care you received during your stay. Your input is valuable to us. We encourage you to complete and return your survey in the envelope provided. We hope you will choose us in the future for your healthcare needs. Patient Information     Patient Name VERA Cage 3/25/1921      Care Provided at:     Name Address Phone       [address] [address] [phone]            Your Visit    Here you will find information about your visit, including the reason for your visit. Please take this sheet with you when you visit your doctor or other health care provider in the future.   It will help determine the best [facility] ([facility]) Continuity of Care Form    Patient Name: Toma Lim   :  3/25/1921  MRN:  [id number]    Admit date:  10/3/2017  Discharge date:  10/5/17    Code Status Order: Full Code   Advance Directives: No    Admitting Physician:  Melania Capps MD  PCP: Teddy Narayan DO    Discharging Nurse: [facility] Unit/Room#: X739/D952-58  Discharging Unit Phone Number: [phone]    Emergency Contact:        Past Surgical History:  Past Surgical History:   Procedure Laterality Date    CATARACT REMOVAL      CHOLECYSTECTOMY      SKIN BIOPSY         Immunization History: There is no immunization history on file for this patient. Active Problems:  Patient Active Problem List   Diagnosis    Lung mass    [facility]    Age-related cataract    Seizures ([facility]    Left renal mass    COPD (chronic obstructive pulmonary disease) ([facility]    Right sided Hemorrhagic cerebral infarct (HCC)    Dysphagia, oropharyngeal phase    Atrial fibrillation (HCC)    Dehydration    Hyperkalemia    Hyponatremia       Isolation/Infection:   Isolation     No Isolation            Nurse Assessment:  Last Vital Signs: BP (!) 124/45  Pulse 62  Temp 97.3 °F (36.3 °C) (Oral)   Resp 20  Ht 5' 2\" (1.575 m)  Wt 107 lb (48.5 kg)  SpO2 98%  BMI 19.57 kg/m2    Last documented pain score (0-10 scale): Pain Level: 0  Last Weight:   Wt Readings from Last 1 Encounters:   10/03/17 107 lb (48.5 kg)     Mental Status:  oriented and alert     IV Access:  - None    Nursing Mobility/ADLs:  Walking   Assisted  Transfer  Assisted  Bathing  Assisted  Dressing  Assisted  Toileting  Assisted  Feeding  Independent  Med Admin  Assisted  Med Delivery   whole    Wound Care Documentation and Therapy:        Elimination:  Continence:   · Bowel:  Yes  · Bladder: Yes  Urinary Catheter: None   Colostomy/Ileostomy/Ileal Conduit: Yes    Date of Last BM: 10/5/17 has been given to me, the patient and/or responsible adult.            Patient Signature/Responsible Adult:____________________    Clinician Signature:_____________________    Date:_____________________    Time:_____________________

## 2017-10-04 LAB
ALBUMIN SERPL-MCNC: 2.5 G/DL (ref 3.9–4.9)
ALP BLD-CCNC: 59 U/L (ref 40–130)
ALT SERPL-CCNC: 12 U/L (ref 0–33)
ANION GAP SERPL CALCULATED.3IONS-SCNC: 15 MEQ/L (ref 7–13)
AST SERPL-CCNC: 20 U/L (ref 0–35)
BILIRUB SERPL-MCNC: 0.3 MG/DL (ref 0–1.2)
BUN BLDV-MCNC: 18 MG/DL (ref 8–23)
CALCIUM SERPL-MCNC: 7.6 MG/DL (ref 8.6–10.2)
CHLORIDE BLD-SCNC: 98 MEQ/L (ref 98–107)
CO2: 16 MEQ/L (ref 22–29)
CREAT SERPL-MCNC: 0.62 MG/DL (ref 0.5–0.9)
GFR AFRICAN AMERICAN: >60
GFR NON-AFRICAN AMERICAN: >60
GLOBULIN: 2.9 G/DL (ref 2.3–3.5)
GLUCOSE BLD-MCNC: 78 MG/DL (ref 74–109)
HCT VFR BLD CALC: 26.1 % (ref 37–47)
HEMOGLOBIN: 8.7 G/DL (ref 12–16)
MCH RBC QN AUTO: 30.6 PG (ref 27–31.3)
MCHC RBC AUTO-ENTMCNC: 33.1 % (ref 33–37)
MCV RBC AUTO: 92.3 FL (ref 82–100)
PDW BLD-RTO: 15.4 % (ref 11.5–14.5)
PLATELET # BLD: 241 K/UL (ref 130–400)
POTASSIUM SERPL-SCNC: 4.6 MEQ/L (ref 3.5–5.1)
RBC # BLD: 2.83 M/UL (ref 4.2–5.4)
SODIUM BLD-SCNC: 129 MEQ/L (ref 132–144)
TOTAL PROTEIN: 5.4 G/DL (ref 6.4–8.1)
WBC # BLD: 8.2 K/UL (ref 4.8–10.8)

## 2017-10-04 PROCEDURE — 80053 COMPREHEN METABOLIC PANEL: CPT

## 2017-10-04 PROCEDURE — 1210000000 HC MED SURG R&B

## 2017-10-04 PROCEDURE — 6370000000 HC RX 637 (ALT 250 FOR IP): Performed by: INTERNAL MEDICINE

## 2017-10-04 PROCEDURE — 93010 ELECTROCARDIOGRAM REPORT: CPT | Performed by: INTERNAL MEDICINE

## 2017-10-04 PROCEDURE — 36415 COLL VENOUS BLD VENIPUNCTURE: CPT

## 2017-10-04 PROCEDURE — 6360000002 HC RX W HCPCS: Performed by: INTERNAL MEDICINE

## 2017-10-04 PROCEDURE — 2580000003 HC RX 258: Performed by: PHYSICIAN ASSISTANT

## 2017-10-04 PROCEDURE — 85027 COMPLETE CBC AUTOMATED: CPT

## 2017-10-04 RX ADMIN — FOLIC ACID 1 MG: 1 TABLET ORAL at 21:02

## 2017-10-04 RX ADMIN — DOCUSATE SODIUM 100 MG: 100 CAPSULE, LIQUID FILLED ORAL at 08:21

## 2017-10-04 RX ADMIN — ENOXAPARIN SODIUM 40 MG: 40 INJECTION SUBCUTANEOUS at 08:20

## 2017-10-04 RX ADMIN — LEVETIRACETAM 500 MG: 500 TABLET ORAL at 08:19

## 2017-10-04 RX ADMIN — PANTOPRAZOLE SODIUM 40 MG: 40 GRANULE, DELAYED RELEASE ORAL at 06:34

## 2017-10-04 RX ADMIN — HYDROXYCHLOROQUINE SULFATE 200 MG: 200 TABLET, FILM COATED ORAL at 08:20

## 2017-10-04 RX ADMIN — LEVETIRACETAM 500 MG: 500 TABLET ORAL at 21:02

## 2017-10-04 RX ADMIN — ACETAMINOPHEN 650 MG: 325 TABLET ORAL at 08:20

## 2017-10-04 RX ADMIN — SODIUM CHLORIDE: 9 INJECTION, SOLUTION INTRAVENOUS at 14:38

## 2017-10-04 RX ADMIN — DOCUSATE SODIUM 100 MG: 100 CAPSULE, LIQUID FILLED ORAL at 21:02

## 2017-10-04 ASSESSMENT — ENCOUNTER SYMPTOMS
COUGH: 0
HEARTBURN: 0
DOUBLE VISION: 0
NAUSEA: 0
SPUTUM PRODUCTION: 0
DIARRHEA: 0
ABDOMINAL PAIN: 0
VOMITING: 0
SHORTNESS OF BREATH: 0
CONSTIPATION: 0

## 2017-10-04 ASSESSMENT — PAIN DESCRIPTION - ORIENTATION
ORIENTATION: LEFT;UPPER

## 2017-10-04 ASSESSMENT — PAIN SCALES - GENERAL
PAINLEVEL_OUTOF10: 3
PAINLEVEL_OUTOF10: 1
PAINLEVEL_OUTOF10: 3
PAINLEVEL_OUTOF10: 0
PAINLEVEL_OUTOF10: 1

## 2017-10-04 ASSESSMENT — PAIN DESCRIPTION - FREQUENCY: FREQUENCY: INTERMITTENT

## 2017-10-04 ASSESSMENT — PAIN DESCRIPTION - DESCRIPTORS: DESCRIPTORS: ACHING

## 2017-10-04 ASSESSMENT — PAIN DESCRIPTION - LOCATION
LOCATION: SHOULDER
LOCATION: BACK

## 2017-10-04 NOTE — ED PROVIDER NOTES
Genitourinary: Negative for hematuria. Musculoskeletal: Negative for neck pain and neck stiffness. Skin: Negative for color change and rash. Allergic/Immunologic: Negative. Neurological: Positive for weakness. Negative for dizziness, seizures, syncope and numbness. Hematological: Negative. Psychiatric/Behavioral: Negative. All other systems reviewed and are negative. Except as noted above the remainder of the review of systems was reviewed and negative.        PAST MEDICAL HISTORY     Past Medical History:   Diagnosis Date    Abnormality of gait and mobility     Age-related cataract 7/3/2015    Arthritis     Cerebral ischemia     [facility]ch[facility] disease) [facility]     Debility     Hypertension     Hypertensive urgency 9/11/2017    Left renal mass     Lung mass 9/11/2017    NSTEMI (non-ST elevated myocardial infarction) ([facility] 9/12/2017    Osteoporosis 4/20/2004    Pleural effusion 9/11/2017    Rheumatoid arthritis ([facility] 4/20/2004    Overview:  Dr. Hank Mason Right sided Hemorrhagic infarct ([facility]     Seizures ([facility]     Tachycardia 9/11/2017    Tachypnea 9/11/2017         SURGICAL HISTORY       Past Surgical History:   Procedure Laterality Date    CATARACT REMOVAL      CHOLECYSTECTOMY      SKIN BIOPSY           CURRENT MEDICATIONS       Previous Medications    FOLIC ACID (FOLVITE) 1 MG TABLET    Take 1 mg by mouth daily except monday    HYDROXYCHLOROQUINE (PLAQUENIL) 200 MG TABLET    Take 200 mg by mouth daily    LEVETIRACETAM (KEPPRA) 500 MG TABLET    Take 1 tablet by mouth 2 times daily    METHOTREXATE (RHEUMATREX) 2.5 MG CHEMO TABLET    Take 2.5 mg by mouth once a week    PANTOPRAZOLE SODIUM (PROTONIX) 40 MG PACK PACKET    Take 40 mg by mouth every morning (before breakfast)    SULFAMETHOXAZOLE-TRIMETHOPRIM (BACTRIM DS;SEPTRA DS) 800-160 MG PER TABLET    Take 1 tablet by mouth every 12 hours for 10 days       ALLERGIES     Aspirin    FAMILY HISTORY Family History   Problem Relation Age of Onset    Family history unknown: Yes          SOCIAL HISTORY       Social History     Social History    Marital status:      Spouse name: N/A    Number of children: N/A    Years of education: N/A     Social History Main Topics    Smoking status: Former Smoker    Smokeless tobacco: None    Alcohol use No    Drug use: No    Sexual activity: Not Asked     Other Topics Concern    None     Social History Narrative    The patient lives with her son and daughter-in-law in a two story home with two steps to enter the home. There are bedrooms and bathroom are on the first floor. Her social support includes her family. She has a walker and garb bars at home. She was not receiving community services prior to admission. She does wear eyeglasses. It is not indicated that she has history of falls prior to admission. She was independent with mobility prior to admission. She required assistance for self care prior to admission. Her goal is to get stronger and return home. SCREENINGS             PHYSICAL EXAM    (up to 7 for level 4, 8 or more for level 5)   ED Triage Vitals   BP Temp Temp Source Pulse Resp SpO2 Height Weight   10/03/17 1915 10/03/17 1915 10/03/17 1915 10/03/17 1915 10/03/17 1915 10/03/17 1915 10/03/17 1915 10/03/17 1915   132/65 98.6 °F (37 °C) Oral 76 16 95 % 5' 2\" (1.575 m) 107 lb (48.5 kg)       Physical Exam   Constitutional: She is oriented to person, place, and time. She appears well-developed and well-nourished. No distress. HENT:   Head: Normocephalic and atraumatic. Nose: Nose normal.   Mouth/Throat: Oropharynx is clear and moist.   Eyes: EOM are normal. Pupils are equal, round, and reactive to light. No scleral icterus. Neck: Normal range of motion. Neck supple. No tracheal deviation present. Cardiovascular: Normal rate, regular rhythm and normal heart sounds. No murmur heard.   Pulmonary/Chest: Effort normal and breath sounds normal. No respiratory distress. She has no wheezes. She has no rales. Abdominal: Soft. Bowel sounds are normal. She exhibits no distension. There is no tenderness. There is no guarding. Musculoskeletal: Normal range of motion. Neurological: She is alert and oriented to person, place, and time. Skin: Skin is warm and dry. No rash noted. No erythema. Psychiatric: She has a normal mood and affect. Her behavior is normal. Judgment and thought content normal.   Nursing note and vitals reviewed. DIAGNOSTIC RESULTS     EKG: All EKG's are interpreted by the Emergency Department Physician who either signs or Co-signs this chart in the absence of a cardiologist.    Normal sinus rhythm, rate 71 bpm, no acute ST elevation or ischemic changes RADIOLOGY:   Non-plain film images such as CT, Ultrasound and MRI are read by the radiologist. Plain radiographic images are visualized and preliminarily interpreted by the emergency physician with the below findings:    Unchanged right pleural effusion    Interpretation per the Radiologist below, if available at the time of this note:    XR Chest Portable    (Results Pending)         ED BEDSIDE ULTRASOUND:   Performed by ED Physician - none    LABS:  Labs Reviewed   COMPREHENSIVE METABOLIC PANEL - Abnormal; Notable for the following:        Result Value    Sodium 125 (*)     Potassium 5.4 (*)     Chloride 90 (*)     CO2 21 (*)     Anion Gap 14 (*)     BUN 25 (*)     Calcium 8.5 (*)     Alb 3.3 (*)     All other components within normal limits   CBC WITH AUTO DIFFERENTIAL - Abnormal; Notable for the following:     RBC 3.35 (*)     Hemoglobin 10.2 (*)     Hematocrit 31.0 (*)     RDW 15.4 (*)     Monocytes # 1.0 (*)     All other components within normal limits   MAGNESIUM   LACTIC ACID, PLASMA   LIPASE   TROPONIN   CK   URINE RT REFLEX TO CULTURE   TYPE AND SCREEN       All other labs were within normal range or not returned as of this dictation.     EMERGENCY

## 2017-10-04 NOTE — CARE COORDINATION
LSW spoke with Dtr in law and Pt, Pt lives with her son and Dtr in law. The plans is for Pt to return home with Gisel Adamson PT/OT/ST. Pt had [facility] before she came in. Pt will start using a wheeled walker at home. It was just purchased. Lisandro Avila Electronically signed by BUTCH Jauregui on 10/4/2017 at 12:32 PM

## 2017-10-04 NOTE — PROGRESS NOTES
Pt admitted rails up x2. Bed low locked and alarm activated. Fluids hung and oral fluids offered. Pt denies pain.

## 2017-10-04 NOTE — H&P
pertinent home meds when appropriate    Addendum. Patient's sodium and potassium of improved significantly. I'll continue with IV fluids for today will recheck BMP in the morning. I offered possible rehab evaluation again but the patient's daughter-in-law has declined to consider this at this time. I will discuss with physical therapist in the morning and will most likely plan on discharging this patient home.   Pavan Olivas PA-C  10/4/2017

## 2017-10-04 NOTE — PROGRESS NOTES
[facility] Occupational Therapy      Date: 10/4/2017  Patient Name: Valeria Noe        MRN: [id number]  Account: [de-identified]   : 3/25/1921  (80 y.o.)  Room: [place] [address]    Attempted OT tx at 2:23 PM, but pt. unavailable 2° to:    [x] Hold 2 pt drop in Hgb in less than 24 hrs    [] Pt declined     [] Pt. . off floor for test/procedure. Will attempt again when able.     Electronically signed by TRISHA Schultz on 10/4/2017 at 2:23 PM

## 2017-10-04 NOTE — FLOWSHEET NOTE
Patient is awake alert oriented x 3. Is hard of hearing. Patient has no complaints offered. I.V. Fluids are infusing.

## 2017-10-04 NOTE — CARE COORDINATION
MET WITH PATIENT, FREEDOM OF CHOICE, STATES SHE LIVES WITH SON AND HIS WIFE. RECENTLY DC'D FROM REHAB ON 9/26. HAS WALKER, AWAITING PT/OT ASSESSMENT. STATES PLAN IS TO RETURN HOME, MAY BENEFIT FROM PALLIATIVE CARE.  WILL FOLLOW

## 2017-10-04 NOTE — PROGRESS NOTES
Physical Therapy   [facility]    NAME: Jenn Larkin    : 3/25/1921 (80 y.o.)  MRN: [id number]    Account: [de-identified]  Gender: female    PT evaluation and treatment orders received. Chart reviewed. PT eval attempted. Hold PT eval: downtrending Hgb 10.2-->8.7. Will follow and attempt PT evaluation again at earliest convenience.         Electronically signed by Heaven Alvarez PT on 10/4/17 at 9:07 AM

## 2017-10-05 VITALS
SYSTOLIC BLOOD PRESSURE: 151 MMHG | HEIGHT: 62 IN | TEMPERATURE: 97.3 F | RESPIRATION RATE: 18 BRPM | OXYGEN SATURATION: 97 % | HEART RATE: 63 BPM | DIASTOLIC BLOOD PRESSURE: 50 MMHG | BODY MASS INDEX: 19.69 KG/M2 | WEIGHT: 107 LBS

## 2017-10-05 LAB
ANION GAP SERPL CALCULATED.3IONS-SCNC: 11 MEQ/L (ref 7–13)
BACTERIA: ABNORMAL /HPF
BILIRUBIN URINE: NEGATIVE
BLOOD, URINE: ABNORMAL
BUN BLDV-MCNC: 18 MG/DL (ref 8–23)
CALCIUM SERPL-MCNC: 7.1 MG/DL (ref 8.6–10.2)
CHLORIDE BLD-SCNC: 99 MEQ/L (ref 98–107)
CLARITY: CLEAR
CO2: 20 MEQ/L (ref 22–29)
COLOR: YELLOW
CREAT SERPL-MCNC: 0.59 MG/DL (ref 0.5–0.9)
GFR AFRICAN AMERICAN: >60
GFR NON-AFRICAN AMERICAN: >60
GLUCOSE BLD-MCNC: 87 MG/DL (ref 74–109)
GLUCOSE URINE: NEGATIVE MG/DL
KETONES, URINE: NEGATIVE MG/DL
LEUKOCYTE ESTERASE, URINE: ABNORMAL
NITRITE, URINE: NEGATIVE
PH UA: 5.5 (ref 5–9)
POTASSIUM SERPL-SCNC: 4.2 MEQ/L (ref 3.5–5.1)
PROTEIN UA: NEGATIVE MG/DL
RBC UA: ABNORMAL /HPF (ref 0–2)
SODIUM BLD-SCNC: 130 MEQ/L (ref 132–144)
SPECIFIC GRAVITY UA: 1.01 (ref 1–1.03)
UROBILINOGEN, URINE: 0.2 E.U./DL
WBC UA: ABNORMAL /HPF (ref 0–5)

## 2017-10-05 PROCEDURE — 97166 OT EVAL MOD COMPLEX 45 MIN: CPT

## 2017-10-05 PROCEDURE — G8988 SELF CARE GOAL STATUS: HCPCS

## 2017-10-05 PROCEDURE — 2580000003 HC RX 258: Performed by: PHYSICIAN ASSISTANT

## 2017-10-05 PROCEDURE — 97116 GAIT TRAINING THERAPY: CPT

## 2017-10-05 PROCEDURE — 97162 PT EVAL MOD COMPLEX 30 MIN: CPT

## 2017-10-05 PROCEDURE — G8978 MOBILITY CURRENT STATUS: HCPCS

## 2017-10-05 PROCEDURE — 36415 COLL VENOUS BLD VENIPUNCTURE: CPT

## 2017-10-05 PROCEDURE — 6360000002 HC RX W HCPCS: Performed by: INTERNAL MEDICINE

## 2017-10-05 PROCEDURE — 6370000000 HC RX 637 (ALT 250 FOR IP): Performed by: INTERNAL MEDICINE

## 2017-10-05 PROCEDURE — 80048 BASIC METABOLIC PNL TOTAL CA: CPT

## 2017-10-05 PROCEDURE — G8987 SELF CARE CURRENT STATUS: HCPCS

## 2017-10-05 PROCEDURE — G8979 MOBILITY GOAL STATUS: HCPCS

## 2017-10-05 PROCEDURE — 81001 URINALYSIS AUTO W/SCOPE: CPT

## 2017-10-05 RX ORDER — LANOLIN ALCOHOL/MO/W.PET/CERES
325 CREAM (GRAM) TOPICAL
Qty: 20 TABLET | Refills: 0 | Status: SHIPPED | OUTPATIENT
Start: 2017-10-05 | End: 2017-10-05 | Stop reason: HOSPADM

## 2017-10-05 RX ORDER — LANOLIN ALCOHOL/MO/W.PET/CERES
325 CREAM (GRAM) TOPICAL DAILY
Qty: 20 TABLET | Refills: 0 | Status: SHIPPED | OUTPATIENT
Start: 2017-10-05 | End: 2017-10-05

## 2017-10-05 RX ORDER — LANOLIN ALCOHOL/MO/W.PET/CERES
325 CREAM (GRAM) TOPICAL DAILY
Qty: 20 TABLET | Refills: 0 | Status: SHIPPED | OUTPATIENT
Start: 2017-10-05 | End: 2018-03-02 | Stop reason: CLARIF

## 2017-10-05 RX ADMIN — SODIUM CHLORIDE: 9 INJECTION, SOLUTION INTRAVENOUS at 08:24

## 2017-10-05 RX ADMIN — DOCUSATE SODIUM 100 MG: 100 CAPSULE, LIQUID FILLED ORAL at 08:23

## 2017-10-05 RX ADMIN — LEVETIRACETAM 500 MG: 500 TABLET ORAL at 08:23

## 2017-10-05 RX ADMIN — ENOXAPARIN SODIUM 40 MG: 40 INJECTION SUBCUTANEOUS at 08:24

## 2017-10-05 RX ADMIN — HYDROXYCHLOROQUINE SULFATE 200 MG: 200 TABLET, FILM COATED ORAL at 08:23

## 2017-10-05 RX ADMIN — PANTOPRAZOLE SODIUM 40 MG: 40 GRANULE, DELAYED RELEASE ORAL at 06:02

## 2017-10-05 ASSESSMENT — PAIN SCALES - GENERAL
PAINLEVEL_OUTOF10: 0

## 2017-10-05 NOTE — HOME CARE
ROUTED [address], TRANSFER FORM TO [facility] AND NOTIFED ADOLPH IN OFFICE OF THIS DC TODAY. SPOKE WITH PT AND INFORMED HER OF THE PLAN. SHE SEEMED TO UNDERSTAND AND ANSWERED APPROPRIATELY.

## 2017-10-05 NOTE — PROGRESS NOTES
Physical Therapy Med Surg Initial Assessment  Facility/Department: [address] [place] [address] UNIT  [facility]       NAME: Shawn Mahajan  : 3/25/1921 (80 y.o.)  MRN: [id number]  CODE STATUS: Full Code    Date of Service: 10/5/2017    Patient Diagnosis(es): Dehydration [E86.0]   No chief complaint on file. Patient Active Problem List    Diagnosis Date Noted    Dehydration 10/03/2017    Hyperkalemia 10/03/2017    Hyponatremia 10/03/2017    Atrial fibrillation (HCC) 2017    Dysphagia, oropharyngeal phase 2017    Seizures (HCC)     Left renal mass     COPD (chronic obstructive pulmonary disease) (HCC)     Right sided Hemorrhagic cerebral infarct (HCC)     Lung mass 2017    Age-related cataract 2015    Rheumatoid arthritis ([facility] 2004        Past Medical History:   Diagnosis Date    Abnormality of gait and mobility     Age-related cataract 7/3/2015    Arthritis     Cerebral ischemia     COPD (chronic obstructive pulmonary disease) (HCC)     Debility     Hypertension     Hypertensive urgency 2017    Left renal mass     Lung mass 2017    NSTEMI (non-ST elevated myocardial infarction) ([address] 2017    Osteoporosis 2004    Pleural effusion 2017    Rheumatoid arthritis ([address] 2004    Overview:  Dr. Lakisha Moreno Right sided Hemorrhagic infarct ([address]     Seizures ([address]     Tachycardia 2017    Tachypnea 2017     Past Surgical History:   Procedure Laterality Date    CATARACT REMOVAL      CHOLECYSTECTOMY      SKIN BIOPSY         Chart Reviewed: Yes  Patient assessed for rehabilitation services?: Yes  Family / Caregiver Present: Yes (dtr law)  Other (Comment): [facility]    Restrictions:  Restrictions/Precautions: Fall Risk  Body mass index is 19.57 kg/(m^2).      SUBJECTIVE: Subjective: \"I am feeling better\"       Post Treatment Pain Screening:   Pain Screening  Patient Currently in Pain: No    Prior Level of indep with bed mobility   Short term goal 3: amb >75ft with 2ww and mod I  Short term goal 4: indep with HEP  Short term goal 5: functional reaching in standing with 2ww with no LOB  Long term goals  Long term goal 1: 4 stairs and 1 handrail with CGA    Therapy Time:   Individual   Time In 1023   Time Out 1045   Minutes 22         Gait 8 minutes  Lilian Tipton, PT, 10/05/17 at 11:34 AM

## 2017-10-05 NOTE — PROGRESS NOTES
Nutrition Assessment    Type and Reason for Visit: Initial, Positive Nutrition Screen    Nutrition Recommendations:     START FROZEN ONS- BID. Malnutrition Assessment:  · Malnutrition Status: At risk for malnutrition  · Context: Chronic illness  · Findings of the 6 clinical characteristics of malnutrition (Minimum of 2 out of 6 clinical characteristics is required to make the diagnosis of moderate or severe Protein Calorie Malnutrition based on AND/ASPEN Guidelines):  1. Energy Intake-Not available, not able to assess    2. Weight Loss-No significant weight loss,    3. Fat Loss-Mild subcutaneous fat loss, Orbital  4. Muscle Loss-Moderate muscle mass loss, Clavicles (pectoralis and deltoids)  5. Fluid Accumulation-No significant fluid accumulation,    6.  Strength-Not measured    Nutrition Diagnosis:   · Problem: Inadequate oral intake, in context of chronic illness  · Etiology: related to Difficulty swallowing     Signs and symptoms:  as evidenced by Intake 50-75%    Nutrition Assessment:  · Subjective Assessment: Patient admitted with abnormal labs, recently hospitalized. · Nutrition-Focused Physical Findings: Trace Bi-Lateral Edema. 10-4 - Intake/- 2785 ml Output/- 950 ml. · Wound Type: None  · Current Nutrition Therapies:  · Oral Diet Orders: Dysphagia 2, Nectar Thick   · Oral Diet intake: 51-75%  · Oral Nutrition Supplement (ONS) Orders: None  · Anthropometric Measures:  · Ht: 5' 2\" (157.5 cm)   · Admission Body Wt: 107 lb (48.5 kg)  · Usual Body Wt: 108 lb (49 kg) (9-18-17)  · Ideal Body Wt: 110 lb (49.9 kg), % Ideal Body 97%  · BMI Classification: BMI <18.5 Underweight (19.6)  · Comparative Standards (Estimated Nutrition Needs):  · Estimated Daily Total Kcal: 1470 to 1570  · Estimated Daily Protein (g): 59 to 64    Estimated Intake vs Estimated Needs: Intake Improving    Nutrition Risk Level:  Moderate    Nutrition Interventions:   Start ONS  Continued Inpatient Monitoring, Education not appropriate at this time    Nutrition Evaluation:   · Evaluation: Goals set   · Goals: Intake Will Improve To > 75% Of Meals. Weight > 107#. · Monitoring: Meal Intake, Weight, Pertinent Labs, Fluid Balance    See Adult Nutrition Doc Flowsheet for more detail.      Electronically signed by Sunitha Sparrow RD, LD on 10/5/17 at 4:15 PM    Contact Number: [phone]

## 2017-10-05 NOTE — HOME CARE
Transfer To Home Care-current pt    [id number]  Patient Name: Dianne Thomas         Address: [address] [place] [address] [address] [id number]       . MRN: [id number]                          : 3/25/1921  (80 y.o.)       Phone:     [phone] (home)   Gender: female   Demographics Verified:  []Yes   []No   Current pt                                                    SSN:        Diagnosis:    ASSESSMENT AND PLAN      Principal Problem:    Dehydration  Active Problems:    Lung mass    Rheumatoid arthritis ([address]    Age-related cataract    Seizures ([facility] [address]    Left renal mass    COPD (chronic obstructive pulmonary disease) (HCC)    Right sided Hemorrhagic cerebral infarct (HCC)    Dysphagia, oropharyngeal phase    Atrial fibrillation (HCC)    Hyperkalemia    Hyponatremia  Patient came in for treatment of hyponatremia and hyperkalemia. Both of them is improved with IV fluids and Kayexalate.     Slight drop in hemoglobin at least in part due to IV fluids. No active GI bleed noted.     Review of systems and exam is unremarkable.     Family is requesting urinalysis prior to discharge.     We will obtain a urinalysis. Straight cathetered necessary. Will only treat the infection if absolutely necessary.     Patient is on multiple courses of antibiotics at home.     Reviewed x-rays with patient.     Patient's family will follow-up with Dr. Frandy Gill   regarding x-ray of the chest pleural effusion and the surgical pathology findings.     Given pleural effusion and copd along with risk of aspiration pt is at high risk for respiratory compromise. family had tried pillows and wedges  and repositioning but wihout success. Will need hospital bed to help with the above and to keep [address] >30degrees.        Jennifer Urbano MD     Code Status: Resuscitation/Code Status Note on Dianne Thomas  The code status was made Full Code     High Risk For Readmission:  []Yes   [x]No             []  F2F Completed   Ordering Physician: DR Antonia Michel RESUME ORDER. PCP: Nereida March, DO   Anticipated Discharge Date: 10/5/17                GENITOURINARY ()                                 U/A SENT . SEE NOTE ABOVE. GASTROENTEROLOGY (GI)     DIET GENERAL; Dysphagia II Mechanically Altered; Nectar Thick [[email or web address]]     (Order #: [id number])         HOME CARE SERVICES            Ordered On Ordered By      10/5/2017 2:36 PM Mariajose Cramer RN         Order Providers   Authorizing   Rajani Phelps MD   Comments   Juanito [address] RN, PT/OT/HHA/SW       OXYGEN                              )        WOUND CARE                                 Wound Care / Dashawn Chin / Mariangel Aurelio / Leslie Senate / Jazmin Share / [address]       LAB WORK                                     10/5/2017  6:42 AM - Patrice, [facility] Incoming Lab Results From Soft   Component Results   Component Value Ref Range & Units Status Collected Lab   Sodium 130 (L) 132 - 144 mEq/L Final 10/05/2017  5:16 AM MH - [facility]   Potassium 4.2 3.5 - 5.1 mEq/L Final 10/05/2017  5:16 AM MH - [facility]   Chloride 99 98 - 107 mEq/L Final 10/05/2017  5:16 AM [address] [facility]   CO2 20 (L) 22 - 29 mEq/L Final 10/05/2017  5:16 AM MH - [facility]   Anion Gap 11 7 - 13 mEq/L Final 10/05/2017  5:16 AM [address] [facility]   Glucose 87 74 - 109 mg/dL Final 10/05/2017  5:16 AM [address] [facility]   BUN 18 8 - 23 mg/dL Final 10/05/2017  5:16 AM MH - [facility]   CREATININE 0.59 0.50 - 0.90 mg/dL Final 10/05/2017  5:16 AM MH - [facility]   GFR Non- >60.0 >60 Final 10/05/2017  5:16 AM  - [facility]   >60 mL/min/1.73m2 EGFR, calc. for ages 25 and older using the   MDRD formula (not corrected for weight), is valid for stable   renal function.       GFR  >60.0 >60 Final 10/05/2017  5:16 AM  - [facility]   >60 mL/min/1.73m2 EGFR, calc. for ages 25 and older using the   MDRD formula (not corrected for weight), is valid for stable renal function. Calcium 7.1 (L) 8.6 - 10.2 mg/dL Final 10/05/2017  5:16 AM MH - [facility]   10/4/2017  6:48 AM - Patrice, [facility] Incoming Lab Results From Soft   Component Results   Component Value Ref Range & Units Status Collected Lab   WBC 8.2 4.8 - 10.8 K/uL Final 10/04/2017  5:19 AM  - [facility]   RBC 2.83 (L) 4.20 - 5.40 M/uL Final 10/04/2017  5:19 AM  - [facility]   Hemoglobin 8.7 (L) 12.0 - 16.0 g/dL Final 10/04/2017  5:19 AM  - [facility]   Hematocrit 26.1 (L) 37.0 - 47.0 % Final 10/04/2017  5:19 AM  - [facility]   MCV 92.3 82.0 - 100.0 fL Final 10/04/2017  5:19 AM  - [facility]   MCH 30.6 27.0 - 31.3 pg Final 10/04/2017  5:19 AM  - [facility]   MCHC 33.1 33.0 - 37.0 % Final 10/04/2017  5:19 AM  - [facility]   RDW 15.4 (H) 11.5 - 14.5 % Final 10/04/2017  5:19 AM  - [facility]   Platelets 912 000 - 104 K/uL Final 10/04/2017  5:19 AM          OUTPATIENT TESTING                                   [] [facility]         [x] Other Follow-up Dr. Kevin Hudson in 2 days      Request for hospital bed script filled. Spoke to daughter-in-law Shandra Abel who confirmed script be faxed to [facility], which was done along with supporting documentation. LSW encouraged her to f/up with [facility] if she does not hear from them.                                    Home Care Coordinator: RN Signature Electronically signed by Anitha Gracia RN on 10/5/17 at 2:56 PM 10/5/2017

## 2017-10-05 NOTE — PROGRESS NOTES
dtr-in-law cooks and cleans. Ambulates with cane PRN. Pain:   Start of session: 0/10  Description:   Location:   End of session: 0/10  Action: [x] No Action Necessary    [] Patient reports pain at acceptable level for treatment  [] Nursing notified    [] Other      Objective:  Observation:  Pt seated in chair upon therapist arrival. Thoracic kyphosis present. Orientation: Oriented to  [x] Person   [x] Place  [x]Time    Vision:   []  WFL   [x] Impaired  Comments: glasses      Hearing:  [] WFL   [x] Impaired  Comments: [place]   Sensation:   [x] WFL   []  Impaired   Comments:      Cognition:   [x] WFL   [] Impaired  Comments:    Communication:   [x] WFL   [] Impaired  Comments:    Range of Motion:  R UE AROM/PROM: [x]  WFL [] Impaired  Comments:   L UE AROM/PROM:  []  WFL [x] Impaired  Comments: less than 90° flexion AROM.  Pt reported pain in shoulder from fall in past.     Strength:   R UE Strength: []1    [] 2   [] 2+   [x] 3   [] 3+   [] 4   [] 4+  [] 5  Comments:   L UE Strength:  []1    [] 2   [] 2+   [x] 3   [] 3+  [] 4   [] 4+   [] 5  Comments:     Quality of Movement:  [x] Good   [] Fair   [] Poor     Coordination:  Gross motor: [x] WFL   [] Impaired   Fine motor: [] WFL   [x] Impaired     Functional Mobility:  Toilet Transfers:  NT, pt used toilet prior to therapist arrival   Bed Transfer:  NT, Pt seated in chair   Sit to stand: SBA  Chair to Chair:  SBA    Seated Balance:      Static: [x] Good  [] Fair   [] Poor   Dynamic: [x]  Good  [] Fair   [] Poor     Standing Balance:     Static: [] Good   [x] Fair+  [] Poor   Dynamic: [] Good   [x] Fair+   [] Poor     Functional Endurance: [] Good  [x] Fair  [] Poor     ADLs  Feeding: Set up   UE Dressing:  NT  LB Dressing:  Pt IND to don/doff socks   Bathing:  NT  Toileting: NT, pt declined  Grooming: Set up     Treatment Plan will consist of:   [x] ADL Training   [x] Strengthening   [x] Endurance   [] Transfer Training   []  DME ed      [] HEP  [] Manual Therapy OT  10/5/2017, 11:36 AM

## 2017-10-05 NOTE — DISCHARGE SUMMARY
weakness  Skin: Warm and dry. No erythema rash on exposed extremities. Apart from some persistent bruises no other significant findings. Patient is alert and awake and sitting up in a chair. Patient is able to ambulate well to the bathroom and back. Exam is otherwise unremarkable. DATA:   Recent Labs      10/03/17   2015  10/04/17   0519   WBC  8.7  8.2   HGB  10.2*  8.7*   HCT  31.0*  26.1*   MCV  92.7  92.3   PLT  273  241     Recent Labs      10/03/17   2015  10/04/17   0519  10/05/17   0516   NA  125*  129*  130*   K  5.4*  4.6  4.2   CL  90*  98  99   CO2  21*  16*  20*   BUN  25*  18  18   CREATININE  0.83  0.62  0.59   GLUCOSE  104  78  87   CALCIUM  8.5*  7.6*  7.1*   PROT  6.7  5.4*   --    LABALBU  3.3*  2.5*   --    BILITOT  0.3  0.3   --    ALKPHOS  76  59   --    AST  22  20   --    ALT  14  12   --    LABGLOM  >60.0  >60.0  >60.0   GFRAA  >60.0  >60.0  >60.0   GLOB  3.4  2.9   --          No results for input(s): PHART, TIU2IQG, PO2ART, DGQ6JDQ, BEART, P0YQKCRB in the last 72 hours. O2 Device: None (Room air)  Lab Results   Component Value Date    LACTA 1.2 10/03/2017        MEDICATIONS during current hospitalization:    Continuous Infusions:   sodium chloride 75 mL/hr at 10/05/17 1928       Scheduled Meds:   folic acid  1 mg Oral Daily    hydroxychloroquine  200 mg Oral Daily    levETIRAcetam  500 mg Oral BID    [START ON 10/9/2017] methotrexate  2.5 mg Oral Weekly    pantoprazole sodium  40 mg Oral QAM AC    docusate sodium  100 mg Oral BID    enoxaparin  40 mg Subcutaneous Daily       PRN Meds:acetaminophen, magnesium hydroxide, ondansetron    Radiology  Xr Chest Standard Two Vw    Result Date: 9/26/2017  1. Significant decrease in the size of the right-sided pleural effusion. No evidence of pneumothorax. There is right lower lobe residual fluid with atelectasis, though underlying nodule is not excluded.  COMPARISON: September 12, 2017 DIAGNOSIS:  Post thoracentesis COMMENTS: ANGLE90 Pleural effusion ICD10 TECHNIQUE: XR CHEST STANDARD TWO VW FINDINGS: No evidence of pneumothorax. Right lower lobe atelectasis, though underlying nodule not excluded. Trace amount of right pleural effusion is seen. Cardiac silhouette is normal in size. Visualized soft tissues, and osseous structures are unremarkable. Ct Head Wo Contrast    Result Date: 9/13/2017  EXAM: CT SCAN OF THE BRAIN WITHOUT CONTRAST COMPARISON: NONE AVAILABLE REASONS FOR EXAMINATION:     CHANGE IN MENTAL STATUS, POSSIBLE RECENT SEIZURE, HYPERTENSION TECHNIQUE:  CT brain is obtained without IV contrast agent. FINDINGS: There are no prior CT brain scans available for comparison purposes. The unenhanced CT scan of the brain demonstrates no evidence of a skull fracture. There is cerebral atrophy and age related findings in the brain. Patchy white matter hypoattenuation is likely a sequela of small vessel disease. Also, there are bilateral old \"lacunar infarctions  There is no acute CVA visible at this time. There is a 2 mm punctate hyperdensity within the right parietal white matter which may very well represent a benign physiologic calcification however, a small punctate hemorrhagic focus is not totally excluded. A follow-up CT brain in 24 or 48 hours may be offered for further evaluation. There is no intracranial mass, \"mass effect\" or midline shift. Sinuses and mastoids appear clear. 1. CEREBRAL ATROPHY AND AGE RELATED FINDINGS IN THE BRAIN. 2. PATCHY WHITE MATTER HYPOATTENUATION IS LIKELY A SEQUELA OF SMALL VESSEL DISEASE. 3. BILATERAL OLD \"LACUNAR\" INFARCTIONS BUT NO ACUTE CVA VISIBLE AT THIS TIME. 4. PUNCTATE HYPERDENSE FOCUS IN RIGHT PARIETAL WHITE MATTER MAY BE PHYSIOLOGIC CALCIFICATION OR SMALL PETECHIAL HEMORRHAGE. FOLLOW-UP CT BRAIN IN 24 OR 48 HOURS IS SUGGESTED.  All CT scans at this facility use dose modulation, iterative reconstruction, and/or weight based dosing when appropriate to reduce radiation dose to as low as reasonably achievable. Ct Chest Wo Contrast    Result Date: 9/11/2017  EXAMINATION: CT CHEST WO CONTRAST, 9/11/2017 6:00 PM History: 80year-old female, shortness of breath with exertion Comparison: None Technique: Helical axial images were obtained from the thoracic inlet through the lung bases without intravenous contrast. Coronal and sagittal images were obtained from the axial data. All CT scans at this facility use dose modulation, iterative reconstruction, and/or weight based dosing when appropriate to reduce radiation dose to as low as reasonably achievable. Findings: Respiratory motion is contrast limits evaluation. LINES/DEVICES: None. LUNGS: There is right middle lobe collapse and near complete lower lobe collapse. This may be due to compressive atelectasis from pleural effusion. A hilar or pulmonary mass is not excluded. AIRWAY: Unremarkable. PLEURA: There is a moderate sized right pleural effusion and a small left pleural effusion. Pleural based mass or atelectasis in the left lingula, series 602 image 41, measures 0.8 x 1.1 cm. . MEDIASTINUM: There are aortic calcifications. There are coronary artery calcifications. Cannot evaluate for adenopathy in the absence of contrast. UPPER ABDOMEN: There is a mass off of the superior pole of the left kidney measuring 2.5 x 3.2 x 2.1 cm. There are numerous fluid-attenuation lesions throughout the liver, completely characterized on this exam but likely represent cysts. There are additional low-attenuation lesions which are tortuous small to characterize. BONES/SOFT TISSUES: There are degenerative changes of the spine. The sternum appears fractured, however this is thought to be due to respiratory artifact, series 602 image 31. Correlate with point tenderness. Impression: 1. Moderate right pleural effusion and small left pleural effusion.  2. Complete right middle lobe collapse and near complete right lower lobe collapse this is likely due to compressive ATELECTASIS RIGHT MIDDLE LOBE. MODERATE RIGHT-SIDED PLEURAL EFFUSION DECREASED FROM LAST EXAM. LEFT PLEURAL EFFUSION HAS RESOLVED. LEFT LUNG IS CLEAR. NO NEW INFILTRATE. SUSPECT LIVER CYSTS. 2.5 CM MASS SUPERIOR POLE LEFT KIDNEY, INDETERMINATE. All CT scans at this facility use dose modulation, iterative reconstruction, and/or weight based dosing when appropriate to reduce radiation dose to as low as reasonably achievable. Xr Chest Portable    Result Date: 10/4/2017  XR CHEST PORTABLE: 10/3/2017 CLINICAL HISTORY: Shortness of breath. COMPARISON: Portable chest 9/15/2017, and chest CTA 6 9/16/2017. A portable upright AP radiograph of the chest was obtained. FINDINGS: A moderate-sized right pleural effusion with adjacent basilar atelectasis, and mild right apical scarring, appears unchanged from the previous studies. The heart remains borderline enlarged, exaggerated by technique. There is no developing pulmonary infiltrate, significant left pleural effusion, vascular congestion, pneumothorax, or displaced fractures identified. MODERATE-SIZED RIGHT PLEURAL EFFUSION AND PROBABLE MILD RIGHT BASILAR ATELECTASIS. Xr Chest Portable    Result Date: 9/12/2017  EXAMINATION: XR CHEST PORTABLE REASON FOR EXAM: evaluate right side FINDINGS: Portable AP chest reveals opacification at the right base related to infiltrate and effusion. There is mild shift of midline structures to the right side indicating underlying atelectasis as well. Pneumonia and parapneumonic effusion suspected. Central lesion not excluded. No previous studies for comparison. Follow until clear. Right lung field essentially free of active disease. Bones intact. Degenerative changes overlie the axial skeleton and right shoulder. MODERATELY EXTENSIVE INFILTRATE AND EFFUSION RIGHT BASE WITH ASSOCIATED VOLUME LOSS.  SHORT INTERVAL FOLLOW-UP STUDIES RECOMMENDED     Us Thoracentesis    Technically successful ultrasound-guided thoracentesis without immediate complications. HISTORY: Roque Dickey is a Female of 80 years age. DIAGNOSIS: Pleural effusion COMMENTS: J90 Pleural effusion ICD10 COMPARISON: None available. PROCEDURE: Following the discussion of procedure, risks versus benefits, possible complications, informed consent was obtained. Following universal protocol, patient and site verification was performed with a \"timeout\" prior to the procedure. Brief survey of the patient's right hemithorax demonstrate moderate amount of pleural effusion. After selection of an appropriate site for thoracentesis, the thoracic skin was prepped and draped in usual sterile fashion. Under ultrasound guidance, using lidocaine for local anesthesia, a 19-gauge [place] [employer] catheter was inserted in the pleural cavity until effusion was aspirated. Using Vacutainer bottles, 600 mL of clear yellow effusion was drained. The catheter was removed and the aspiration site dressed. The fluid was sent for laboratory analysis. The patient tolerated procedure well. No immediate complications were identified. Subsequent chest radiograph demonstrated no evidence of pneumothorax. The patient left the radiology department in stable condition. Radiology nurse monitored patient's  vital signs throughout the procedure which lasted approximately 30 minutes. Site of thoracentesis:  Right hemithorax Amount of aspirated pleural effusion: 600 cc     Mri Brain Wo Contrast    Result Date: 9/14/2017  EXAMINATION:MRI BRAIN WO CONTRAST CLINICAL HISTORY: CEREBRAL ISCHEMIA TECHNIQUE:  Unenhanced scans were obtained. T1 and T2 weighted sequences along with FLAIR and diffusion weighted imaging and gradient echo axial sequences were acquired. FINDINGS: No intra-axial mass or extra-axial mass or fluid collection. No restricted diffusion to suggest acute infarct. No blood products. No significant demyelinating process is evident.  Nonspecific White matter changes with associated atrophy and prominent perivascular CSF is consistent with senescent changes of aging. Benign calcification in the central white matter of the right cerebral  hemisphere likely related to previous inflammation and of no significance. Normal signal void in the cerebral vasculature at the skull base. Extracranial soft tissues unremarkable. NEGATIVE UNENHANCED MRI OF THE BRAIN. Fluoroscopy Modified Barium Swallow With Video    Result Date: 9/19/2017  EXAMINATION: MODIFIED BARIUM SWALLOW: CLINICAL DATA:  ASPIRATION PNEUMONIA. IMAGE/TECHNIQUE: A total of 28 series images over the course of 4.2 minutes was obtained. FINDINGS: In cooperation with Speech pathology, a modified barium swallow using various consistencies of both solids and liquids with dynamic imaging was performed. Patient's oral stage was characterized by moderate oral dysphagia and decreased mastication and impaired bolus control. There is spillover to the vallecula. There is moderate oral residuals. Patient's pharyngeal stage was characterized by moderate pharyngeal dysphagia. There is decreased laryngeal excursion. There is penetration and aspiration noted. There are vallecular residuals with all consistencies tested. THERE IS LARYNGEAL PENETRATION AND ASPIRATION NOTED. RECOMMENDATION BY SPEECH PATHOLOGY TO FOLLOW. ASSESSMENT AND PLAN     Principal Problem:    Dehydration  Active Problems:    Lung mass    Rheumatoid arthritis (HCC)    Age-related cataract    Seizures (HCC)    Left renal mass    COPD (chronic obstructive pulmonary disease) (HCC)    Right sided Hemorrhagic cerebral infarct (HCC)    Dysphagia, oropharyngeal phase    Atrial fibrillation (HCC)    Hyperkalemia    Hyponatremia  Patient came in for treatment of hyponatremia and hyperkalemia. Both of them is improved with IV fluids and Kayexalate. Slight drop in hemoglobin at least in part due to IV fluids. No active GI bleed noted. Review of systems and exam is unremarkable.     Family is requesting urinalysis prior to discharge. We will obtain a urinalysis. Straight cathetered necessary. Will only treat the infection if absolutely necessary. Patient is on multiple courses of antibiotics at home. Reviewed x-rays with patient. Patient's family will follow-up with Dr. Sally Matson   regarding x-ray of the chest pleural effusion and the surgical pathology findings. Given pleural effusion and copd along with risk of aspiration pt is at high risk for respiratory compromise. family had tried pillows and wedges  and repositioning but wihout success. Will need hospital bed to help with the above and to keep HOB >30degrees.       Maty Michaud MD

## 2017-10-05 NOTE — PLAN OF CARE
Problem: IP BALANCE  Goal: LTG - patient will maintain standing balance to allow for completion of daily activities  Outcome: Ongoing

## 2017-10-05 NOTE — CARE COORDINATION
Request for hospital bed script filled. Spoke to daughter-in-law Christine Spivey who confirmed script be faxed to [facility], which was done along with supporting documentation. LSW encouraged her to f/up with [facility] if she does not hear from them.

## 2017-10-06 LAB
EKG ATRIAL RATE: 71 BPM
EKG P AXIS: 31 DEGREES
EKG P-R INTERVAL: 144 MS
EKG Q-T INTERVAL: 402 MS
EKG QRS DURATION: 82 MS
EKG QTC CALCULATION (BAZETT): 436 MS
EKG R AXIS: 67 DEGREES
EKG T AXIS: 39 DEGREES
EKG VENTRICULAR RATE: 71 BPM

## 2017-11-29 ENCOUNTER — OFFICE VISIT (OUTPATIENT)
Dept: PULMONOLOGY | Age: 82
End: 2017-11-29

## 2017-11-29 VITALS
RESPIRATION RATE: 14 BRPM | HEIGHT: 64 IN | OXYGEN SATURATION: 97 % | DIASTOLIC BLOOD PRESSURE: 64 MMHG | SYSTOLIC BLOOD PRESSURE: 138 MMHG | WEIGHT: 102 LBS | BODY MASS INDEX: 17.42 KG/M2 | HEART RATE: 63 BPM

## 2017-11-29 DIAGNOSIS — J90 PLEURAL EFFUSION: Primary | ICD-10-CM

## 2017-11-29 PROCEDURE — G8427 DOCREV CUR MEDS BY ELIG CLIN: HCPCS | Performed by: INTERNAL MEDICINE

## 2017-11-29 PROCEDURE — G8419 CALC BMI OUT NRM PARAM NOF/U: HCPCS | Performed by: INTERNAL MEDICINE

## 2017-11-29 PROCEDURE — 1090F PRES/ABSN URINE INCON ASSESS: CPT | Performed by: INTERNAL MEDICINE

## 2017-11-29 PROCEDURE — G8598 ASA/ANTIPLAT THER USED: HCPCS | Performed by: INTERNAL MEDICINE

## 2017-11-29 PROCEDURE — 1123F ACP DISCUSS/DSCN MKR DOCD: CPT | Performed by: INTERNAL MEDICINE

## 2017-11-29 PROCEDURE — 4040F PNEUMOC VAC/ADMIN/RCVD: CPT | Performed by: INTERNAL MEDICINE

## 2017-11-29 PROCEDURE — 99214 OFFICE O/P EST MOD 30 MIN: CPT | Performed by: INTERNAL MEDICINE

## 2017-11-29 PROCEDURE — 1036F TOBACCO NON-USER: CPT | Performed by: INTERNAL MEDICINE

## 2017-11-29 PROCEDURE — G8484 FLU IMMUNIZE NO ADMIN: HCPCS | Performed by: INTERNAL MEDICINE

## 2017-11-29 RX ORDER — CLOPIDOGREL BISULFATE 75 MG/1
75 TABLET ORAL DAILY
Status: ON HOLD | COMMUNITY
End: 2018-04-16

## 2017-11-29 RX ORDER — TRAMADOL HYDROCHLORIDE 50 MG/1
50 TABLET ORAL EVERY 4 HOURS PRN
Status: ON HOLD | COMMUNITY
End: 2020-05-25 | Stop reason: HOSPADM

## 2017-11-29 ASSESSMENT — ENCOUNTER SYMPTOMS
DIARRHEA: 0
SHORTNESS OF BREATH: 0
WHEEZING: 0
SORE THROAT: 0
ABDOMINAL PAIN: 0
CHEST TIGHTNESS: 0
NAUSEA: 0
SINUS PRESSURE: 0
VOMITING: 0
RHINORRHEA: 0
COUGH: 0

## 2017-11-29 NOTE — PROGRESS NOTES
Subjective:     Catie Mccarty is a 80 y.o. female who complains today of:     Chief Complaint   Patient presents with    Other     pleural effusion    Follow-Up from Hospital       HPI  This is a 51-year-old white female who is known to me from recent hospital stay in September when she presented with chest discomfort and finding of right pleural effusion. At that time malignant effusion was suspected and patient had a thoracentesis done. Fluid was exudative. Cytology was showing atypical mesothelial cells. Patient was discharged to a nursing home where she is receiving palliative care support and recently has been requiring tramadol for pain off-and-on. Patient is still otherwise comfortable in no distress denying any shortness breath cough sputum production or hemoptysis    Allergies:  Alendronate and Aspirin  Past Medical History:   Diagnosis Date    Abnormality of gait and mobility     Age-related cataract 7/3/2015    Arthritis     Cerebral ischemia     [facility] disease[facility] ([facility])     Debility     Hypertension     Hypertensive urgency 9/11/2017    Left renal mass     Lung mass 9/11/2017    NSTEMI (non-ST elevated myocardial infarction) ([address] 9/12/2017    Osteoporosis 4/20/2004    Pleural effusion 9/11/2017    Rheumatoid arthritis ([address] 4/20/2004    Overview:  Dr. Kaveh Hill Right sided Hemorrhagic infarct ([address]     Seizures ([address]     Tachycardia 9/11/2017    Tachypnea 9/11/2017     Past Surgical History:   Procedure Laterality Date    CATARACT REMOVAL      CHOLECYSTECTOMY      SKIN BIOPSY       Family History   Problem Relation Age of Onset    Family history unknown: Yes     Social History     Social History    Marital status:      Spouse name: N/A    Number of children: N/A    Years of education: N/A     Occupational History    Not on file.      Social History Main Topics    Smoking status: Former Smoker    Smokeless tobacco: Not on file   Kay Dyer Alcohol use No    Drug use: No    Sexual activity: Not on file     Other Topics Concern    Not on file     Social History Narrative    The patient lives with her son and daughter-in-law in a two story home with two steps to enter the home. There are bedrooms and bathroom are on the first floor. Her social support includes her family. She has a walker and garb bars at home. She was not receiving community services prior to admission. She does wear eyeglasses. It is not indicated that she has history of falls prior to admission. She was independent with mobility prior to admission. She required assistance for self care prior to admission. Her goal is to get stronger and return home. Review of Systems   Constitutional: Negative for appetite change, chills, diaphoresis, fatigue and fever. HENT: Negative for congestion, nosebleeds, postnasal drip, rhinorrhea, sinus pressure, sneezing and sore throat. Eyes: Negative for visual disturbance. Respiratory: Negative for cough, chest tightness, shortness of breath and wheezing. Cardiovascular: Positive for chest pain. Negative for palpitations and leg swelling. Gastrointestinal: Negative for abdominal pain, diarrhea, nausea and vomiting. Genitourinary: Negative for dysuria and urgency. Musculoskeletal: Negative for arthralgias, joint swelling and myalgias. Skin: Negative for rash. Allergic/Immunologic: Negative for environmental allergies. Neurological: Negative for tremors, weakness, light-headedness and headaches. Psychiatric/Behavioral: Negative for behavioral problems and sleep disturbance. Objective: There were no vitals filed for this visit. Physical Exam   Constitutional: She is oriented to person, place, and time. She appears well-developed and well-nourished. HENT:   Head: Normocephalic and atraumatic.    Mouth/Throat: Oropharynx is clear and moist.   Eyes: EOM are normal. Pupils are equal, round, and reactive to light. Neck: No JVD present. No tracheal deviation present. No thyromegaly present. Cardiovascular: Normal rate and regular rhythm. Exam reveals no gallop. No murmur heard. Pulmonary/Chest: She has no wheezes. She has no rales. She exhibits no tenderness. Abdominal: She exhibits no distension. Musculoskeletal: Normal range of motion. She exhibits no edema. Neurological: She is alert and oriented to person, place, and time. Coordination normal.   Skin: Skin is warm and dry. No rash noted. Psychiatric: She has a normal mood and affect. Assessment:     1. Pleural effusion       Had long discussion with patient and her family today about the possibility of malignant pleural effusion. The workup required to establish the diagnosis which may involve surgical procedures such as thoracoscopy with biopsies, and the potential for chemotherapy if this is proven to be malignant effusion regardless of the primary source. Given patient's age and overall status I advised against invasive procedures, further workup, or plans for treatment other than comfort measures area patient's son and daughter-in-law are both in complete agreement patient herself is not interested in any invasive testing or treatment      Plan:     No orders of the defined types were placed in this encounter. No orders of the defined types were placed in this encounter. Return if symptoms worsen or fail to improve.       Kelly Clark MD

## 2018-03-02 PROBLEM — C34.92 LUNG CANCER, PRIMARY, WITH METASTASIS FROM LUNG TO OTHER SITE, LEFT (HCC): Status: ACTIVE | Noted: 2018-03-02

## 2018-04-12 PROBLEM — E86.0 DEHYDRATION: Status: RESOLVED | Noted: 2017-10-03 | Resolved: 2018-04-12

## 2018-06-08 LAB
BACTERIA: NORMAL /HPF
BILIRUBIN URINE: NEGATIVE
BLOOD, URINE: ABNORMAL
CASTS 2: NORMAL /LPF
CASTS: NORMAL /LPF
CLARITY: ABNORMAL
COLOR: YELLOW
CRYSTALS, UA: NORMAL
EPITHELIAL CELLS, UA: NORMAL /HPF
GLUCOSE URINE: NEGATIVE MG/DL
KETONES, URINE: NEGATIVE MG/DL
LEUKOCYTE ESTERASE, URINE: ABNORMAL
NITRITE, URINE: POSITIVE
PH UA: 8.5 (ref 5–9)
PROTEIN UA: ABNORMAL MG/DL
RBC UA: NORMAL /HPF (ref 0–2)
RENAL EPITHELIAL, UA: NORMAL /HPF
SPECIFIC GRAVITY UA: 1.01 (ref 1–1.03)
UROBILINOGEN, URINE: 0.2 E.U./DL
WBC UA: NORMAL /HPF (ref 0–5)

## 2018-06-10 LAB
ORGANISM: ABNORMAL
URINE CULTURE, ROUTINE: ABNORMAL
URINE CULTURE, ROUTINE: ABNORMAL

## 2020-05-23 ENCOUNTER — HOSPITAL ENCOUNTER (INPATIENT)
Age: 85
LOS: 3 days | Discharge: HOSPICE | DRG: 308 | End: 2020-05-26
Attending: INTERNAL MEDICINE | Admitting: INTERNAL MEDICINE
Payer: MEDICARE

## 2020-05-23 ENCOUNTER — APPOINTMENT (OUTPATIENT)
Dept: GENERAL RADIOLOGY | Age: 85
DRG: 308 | End: 2020-05-23
Payer: MEDICARE

## 2020-05-23 PROBLEM — I48.91 A-FIB (HCC): Status: ACTIVE | Noted: 2020-05-23

## 2020-05-23 LAB
ALBUMIN SERPL-MCNC: 3.7 G/DL (ref 3.5–4.6)
ALP BLD-CCNC: 79 U/L (ref 40–130)
ALT SERPL-CCNC: 56 U/L (ref 0–33)
ANION GAP SERPL CALCULATED.3IONS-SCNC: 12 MEQ/L (ref 9–15)
APTT: 27.6 SEC (ref 24.4–36.8)
AST SERPL-CCNC: 103 U/L (ref 0–35)
BACTERIA: ABNORMAL /HPF
BASOPHILS ABSOLUTE: 0 K/UL (ref 0–0.2)
BASOPHILS RELATIVE PERCENT: 0.4 %
BILIRUB SERPL-MCNC: 0.8 MG/DL (ref 0.2–0.7)
BILIRUBIN URINE: NEGATIVE
BLOOD, URINE: ABNORMAL
BUN BLDV-MCNC: 50 MG/DL (ref 8–23)
CALCIUM SERPL-MCNC: 9.3 MG/DL (ref 8.5–9.9)
CHLORIDE BLD-SCNC: 100 MEQ/L (ref 95–107)
CLARITY: ABNORMAL
CO2: 26 MEQ/L (ref 20–31)
COLOR: ABNORMAL
CREAT SERPL-MCNC: 1.29 MG/DL (ref 0.5–0.9)
EKG ATRIAL RATE: 144 BPM
EKG ATRIAL RATE: 66 BPM
EKG P AXIS: 11 DEGREES
EKG P-R INTERVAL: 98 MS
EKG Q-T INTERVAL: 276 MS
EKG Q-T INTERVAL: 428 MS
EKG QRS DURATION: 84 MS
EKG QRS DURATION: 88 MS
EKG QTC CALCULATION (BAZETT): 385 MS
EKG QTC CALCULATION (BAZETT): 448 MS
EKG R AXIS: 53 DEGREES
EKG R AXIS: 53 DEGREES
EKG T AXIS: -41 DEGREES
EKG T AXIS: 26 DEGREES
EKG VENTRICULAR RATE: 117 BPM
EKG VENTRICULAR RATE: 66 BPM
EOSINOPHILS ABSOLUTE: 0 K/UL (ref 0–0.7)
EOSINOPHILS RELATIVE PERCENT: 0.2 %
EPITHELIAL CELLS, UA: ABNORMAL /HPF (ref 0–5)
GFR AFRICAN AMERICAN: 46.1
GFR NON-AFRICAN AMERICAN: 38.1
GLOBULIN: 3.6 G/DL (ref 2.3–3.5)
GLUCOSE BLD-MCNC: 134 MG/DL (ref 70–99)
GLUCOSE URINE: NEGATIVE MG/DL
HCT VFR BLD CALC: 29.7 % (ref 37–47)
HEMOGLOBIN: 9.5 G/DL (ref 12–16)
HYALINE CASTS: ABNORMAL /HPF (ref 0–5)
INR BLD: 1.1
KETONES, URINE: ABNORMAL MG/DL
LEUKOCYTE ESTERASE, URINE: ABNORMAL
LYMPHOCYTES ABSOLUTE: 0.8 K/UL (ref 1–4.8)
LYMPHOCYTES RELATIVE PERCENT: 7.1 %
MAGNESIUM: 2.2 MG/DL (ref 1.7–2.4)
MCH RBC QN AUTO: 27.3 PG (ref 27–31.3)
MCHC RBC AUTO-ENTMCNC: 32.1 % (ref 33–37)
MCV RBC AUTO: 84.9 FL (ref 82–100)
MONOCYTES ABSOLUTE: 0.8 K/UL (ref 0.2–0.8)
MONOCYTES RELATIVE PERCENT: 7.2 %
NEUTROPHILS ABSOLUTE: 9.1 K/UL (ref 1.4–6.5)
NEUTROPHILS RELATIVE PERCENT: 85.1 %
NITRITE, URINE: NEGATIVE
PDW BLD-RTO: 18.2 % (ref 11.5–14.5)
PH UA: 5 (ref 5–9)
PLATELET # BLD: 252 K/UL (ref 130–400)
POTASSIUM SERPL-SCNC: 5.6 MEQ/L (ref 3.4–4.9)
PRO-BNP: NORMAL PG/ML
PROTEIN UA: 100 MG/DL
PROTHROMBIN TIME: 13.9 SEC (ref 12.3–14.9)
RBC # BLD: 3.49 M/UL (ref 4.2–5.4)
SARS-COV-2, NAAT: NOT DETECTED
SODIUM BLD-SCNC: 138 MEQ/L (ref 135–144)
SPECIFIC GRAVITY UA: 1.02 (ref 1–1.03)
TOTAL CK: 103 U/L (ref 0–170)
TOTAL PROTEIN: 7.3 G/DL (ref 6.3–8)
TROPONIN: 0.27 NG/ML (ref 0–0.01)
TSH REFLEX: 4.14 UIU/ML (ref 0.44–3.86)
URINE REFLEX TO CULTURE: YES
UROBILINOGEN, URINE: 1 E.U./DL
WBC # BLD: 10.7 K/UL (ref 4.8–10.8)
WBC UA: >100 /HPF (ref 0–5)

## 2020-05-23 PROCEDURE — P9612 CATHETERIZE FOR URINE SPEC: HCPCS

## 2020-05-23 PROCEDURE — 71045 X-RAY EXAM CHEST 1 VIEW: CPT

## 2020-05-23 PROCEDURE — 99285 EMERGENCY DEPT VISIT HI MDM: CPT

## 2020-05-23 PROCEDURE — 81001 URINALYSIS AUTO W/SCOPE: CPT

## 2020-05-23 PROCEDURE — 2500000003 HC RX 250 WO HCPCS: Performed by: NURSE PRACTITIONER

## 2020-05-23 PROCEDURE — 83880 ASSAY OF NATRIURETIC PEPTIDE: CPT

## 2020-05-23 PROCEDURE — 1210000000 HC MED SURG R&B

## 2020-05-23 PROCEDURE — 96375 TX/PRO/DX INJ NEW DRUG ADDON: CPT

## 2020-05-23 PROCEDURE — 6370000000 HC RX 637 (ALT 250 FOR IP): Performed by: INTERNAL MEDICINE

## 2020-05-23 PROCEDURE — 51702 INSERT TEMP BLADDER CATH: CPT

## 2020-05-23 PROCEDURE — 2580000003 HC RX 258: Performed by: INTERNAL MEDICINE

## 2020-05-23 PROCEDURE — 96365 THER/PROPH/DIAG IV INF INIT: CPT

## 2020-05-23 PROCEDURE — U0002 COVID-19 LAB TEST NON-CDC: HCPCS

## 2020-05-23 PROCEDURE — 85610 PROTHROMBIN TIME: CPT

## 2020-05-23 PROCEDURE — 36415 COLL VENOUS BLD VENIPUNCTURE: CPT

## 2020-05-23 PROCEDURE — 85730 THROMBOPLASTIN TIME PARTIAL: CPT

## 2020-05-23 PROCEDURE — 84443 ASSAY THYROID STIM HORMONE: CPT

## 2020-05-23 PROCEDURE — 87086 URINE CULTURE/COLONY COUNT: CPT

## 2020-05-23 PROCEDURE — 93005 ELECTROCARDIOGRAM TRACING: CPT | Performed by: NURSE PRACTITIONER

## 2020-05-23 PROCEDURE — 84484 ASSAY OF TROPONIN QUANT: CPT

## 2020-05-23 PROCEDURE — 87040 BLOOD CULTURE FOR BACTERIA: CPT

## 2020-05-23 PROCEDURE — 82550 ASSAY OF CK (CPK): CPT

## 2020-05-23 PROCEDURE — 6360000002 HC RX W HCPCS: Performed by: NURSE PRACTITIONER

## 2020-05-23 PROCEDURE — 83735 ASSAY OF MAGNESIUM: CPT

## 2020-05-23 PROCEDURE — 2580000003 HC RX 258: Performed by: NURSE PRACTITIONER

## 2020-05-23 PROCEDURE — 6360000002 HC RX W HCPCS: Performed by: INTERNAL MEDICINE

## 2020-05-23 PROCEDURE — 85025 COMPLETE CBC W/AUTO DIFF WBC: CPT

## 2020-05-23 PROCEDURE — 80053 COMPREHEN METABOLIC PANEL: CPT

## 2020-05-23 RX ORDER — ACETAMINOPHEN 650 MG/1
650 SUPPOSITORY RECTAL EVERY 6 HOURS PRN
Status: DISCONTINUED | OUTPATIENT
Start: 2020-05-23 | End: 2020-05-26 | Stop reason: HOSPADM

## 2020-05-23 RX ORDER — SODIUM CHLORIDE 9 MG/ML
INJECTION, SOLUTION INTRAVENOUS CONTINUOUS
Status: DISCONTINUED | OUTPATIENT
Start: 2020-05-23 | End: 2020-05-23

## 2020-05-23 RX ORDER — SODIUM POLYSTYRENE SULFONATE 15 G/60ML
15 SUSPENSION ORAL; RECTAL ONCE
Status: COMPLETED | OUTPATIENT
Start: 2020-05-23 | End: 2020-05-23

## 2020-05-23 RX ORDER — SODIUM CHLORIDE 0.9 % (FLUSH) 0.9 %
10 SYRINGE (ML) INJECTION EVERY 12 HOURS SCHEDULED
Status: DISCONTINUED | OUTPATIENT
Start: 2020-05-23 | End: 2020-05-26 | Stop reason: HOSPADM

## 2020-05-23 RX ORDER — LAMOTRIGINE 25 MG/1
25 TABLET ORAL DAILY
Status: DISCONTINUED | OUTPATIENT
Start: 2020-05-23 | End: 2020-05-26 | Stop reason: HOSPADM

## 2020-05-23 RX ORDER — AMOXICILLIN 250 MG
1 CAPSULE ORAL 2 TIMES DAILY
Status: DISCONTINUED | OUTPATIENT
Start: 2020-05-23 | End: 2020-05-26 | Stop reason: HOSPADM

## 2020-05-23 RX ORDER — DILTIAZEM HYDROCHLORIDE 5 MG/ML
5 INJECTION INTRAVENOUS ONCE
Status: COMPLETED | OUTPATIENT
Start: 2020-05-23 | End: 2020-05-23

## 2020-05-23 RX ORDER — SODIUM CHLORIDE 0.9 % (FLUSH) 0.9 %
10 SYRINGE (ML) INJECTION PRN
Status: DISCONTINUED | OUTPATIENT
Start: 2020-05-23 | End: 2020-05-26 | Stop reason: HOSPADM

## 2020-05-23 RX ORDER — ESCITALOPRAM OXALATE 10 MG/1
5 TABLET ORAL DAILY
Status: DISCONTINUED | OUTPATIENT
Start: 2020-05-23 | End: 2020-05-26 | Stop reason: HOSPADM

## 2020-05-23 RX ORDER — HYDROXYCHLOROQUINE SULFATE 200 MG/1
TABLET, FILM COATED ORAL DAILY
COMMUNITY

## 2020-05-23 RX ORDER — POLYETHYLENE GLYCOL 3350 17 G/17G
17 POWDER, FOR SOLUTION ORAL DAILY PRN
Status: DISCONTINUED | OUTPATIENT
Start: 2020-05-23 | End: 2020-05-26 | Stop reason: HOSPADM

## 2020-05-23 RX ORDER — ONDANSETRON 2 MG/ML
4 INJECTION INTRAMUSCULAR; INTRAVENOUS EVERY 6 HOURS PRN
Status: DISCONTINUED | OUTPATIENT
Start: 2020-05-23 | End: 2020-05-26 | Stop reason: HOSPADM

## 2020-05-23 RX ORDER — FUROSEMIDE 10 MG/ML
20 INJECTION INTRAMUSCULAR; INTRAVENOUS ONCE
Status: COMPLETED | OUTPATIENT
Start: 2020-05-23 | End: 2020-05-23

## 2020-05-23 RX ORDER — ACETAMINOPHEN 325 MG/1
650 TABLET ORAL EVERY 6 HOURS PRN
Status: DISCONTINUED | OUTPATIENT
Start: 2020-05-23 | End: 2020-05-26 | Stop reason: HOSPADM

## 2020-05-23 RX ORDER — ESCITALOPRAM OXALATE 5 MG/1
5 TABLET ORAL DAILY
COMMUNITY

## 2020-05-23 RX ORDER — SULFAMETHOXAZOLE AND TRIMETHOPRIM 400; 80 MG/1; MG/1
1 TABLET ORAL EVERY 12 HOURS SCHEDULED
Status: DISCONTINUED | OUTPATIENT
Start: 2020-05-23 | End: 2020-05-24

## 2020-05-23 RX ORDER — PROMETHAZINE HYDROCHLORIDE 12.5 MG/1
12.5 TABLET ORAL EVERY 6 HOURS PRN
Status: DISCONTINUED | OUTPATIENT
Start: 2020-05-23 | End: 2020-05-26 | Stop reason: HOSPADM

## 2020-05-23 RX ORDER — HYDROXYCHLOROQUINE SULFATE 200 MG/1
200 TABLET, FILM COATED ORAL DAILY
Status: DISCONTINUED | OUTPATIENT
Start: 2020-05-23 | End: 2020-05-26 | Stop reason: HOSPADM

## 2020-05-23 RX ORDER — ACETAMINOPHEN 160 MG/5ML
325 SOLUTION ORAL 2 TIMES DAILY
Status: DISCONTINUED | OUTPATIENT
Start: 2020-05-23 | End: 2020-05-26 | Stop reason: HOSPADM

## 2020-05-23 RX ADMIN — DILTIAZEM HYDROCHLORIDE 5 MG: 5 INJECTION INTRAVENOUS at 09:29

## 2020-05-23 RX ADMIN — SODIUM POLYSTYRENE SULFONATE 15 G: 15 SUSPENSION ORAL; RECTAL at 14:39

## 2020-05-23 RX ADMIN — ACETAMINOPHEN 650 MG: 325 TABLET ORAL at 14:39

## 2020-05-23 RX ADMIN — FUROSEMIDE 20 MG: 10 INJECTION, SOLUTION INTRAMUSCULAR; INTRAVENOUS at 08:47

## 2020-05-23 RX ADMIN — ONDANSETRON 4 MG: 2 INJECTION INTRAMUSCULAR; INTRAVENOUS at 14:39

## 2020-05-23 RX ADMIN — ENOXAPARIN SODIUM 30 MG: 30 INJECTION SUBCUTANEOUS at 14:39

## 2020-05-23 RX ADMIN — CEFTRIAXONE SODIUM 1 G: 1 INJECTION, POWDER, FOR SOLUTION INTRAMUSCULAR; INTRAVENOUS at 08:56

## 2020-05-23 RX ADMIN — SODIUM CHLORIDE: 9 INJECTION, SOLUTION INTRAVENOUS at 14:39

## 2020-05-23 ASSESSMENT — ENCOUNTER SYMPTOMS
SORE THROAT: 0
SHORTNESS OF BREATH: 1
NAUSEA: 0
DIARRHEA: 0
PHOTOPHOBIA: 0
VOMITING: 0
ABDOMINAL PAIN: 0
COUGH: 1
EYE PAIN: 0
RHINORRHEA: 0
BACK PAIN: 0

## 2020-05-23 ASSESSMENT — PAIN SCALES - GENERAL
PAINLEVEL_OUTOF10: 0
PAINLEVEL_OUTOF10: 0
PAINLEVEL_OUTOF10: 4
PAINLEVEL_OUTOF10: 0

## 2020-05-23 ASSESSMENT — PAIN SCALES - WONG BAKER: WONGBAKER_NUMERICALRESPONSE: 4

## 2020-05-23 NOTE — H&P
Department of Internal Medicine  History and Physical      CHIEF COMPLAINT: Shortness of Breath (and low pulse ox for weeks.  )      Reason for Admission:  A-fib ([facility] [I48.91]    History Obtained From: Patient and chart review    HISTORY OF PRESENT ILLNESS:    The patient is a 80 y.o. female who was [facility] after she was noted to be short of breath for past few days now I talked with 1 of the members of the care facility who stated that her oxygen sats were low to 88% and she was complaining of shortness of breath and hence they sent her to the emergency room. No fevers or chills reported. Patient does not wear any oxygen prior to admission. Patient at her baseline is AO x2. Able to make meaningful conversation with the staff, recognizes her family members and the staff at the care facility. Patient is usually in wheelchair and needs 1-2 people to assist her with walker. Patient was noted to be in atrial fibrillation when she came to the emergency room. She received 5 mg of Cardizem and converted to normal sinus rhythm. EVERY MONTH PT GOES THROUGH manic phase when she is up all night for 3-4 nights . She has such phase 3 days ago.      History obtained from son and daughter-in-law    Past Medical History:        Diagnosis Date    Abnormality of gait and mobility     Age-related cataract 7/3/2015    Arthritis     Cerebral ischemia     COPD (chronic obstructive pulmonary disease) ([facility])     Debility     Hypertension     Hypertensive urgency 9/11/2017    Left renal mass     Lung mass 9/11/2017    NSTEMI (non-ST elevated myocardial infarction) ([address] 9/12/2017    Osteoporosis 4/20/2004    Pleural effusion 9/11/2017    Rheumatoid arthritis ([address] 4/20/2004    Overview:  Dr. Mliton Mcpherson Right sided Hemorrhagic infarct ([address]     Seizures ([address]     Tachycardia 9/11/2017    Tachypnea 9/11/2017       Past Surgical History:        Procedure Laterality Date    CATARACT

## 2020-05-23 NOTE — ED PROVIDER NOTES
[facility] ED  EMERGENCY DEPARTMENT ENCOUNTER      Pt Name: Lizz Gloria  MRN: [id number]  [place] 3/25/1921  Date of evaluation: 5/23/2020  Provider: ALMA [facility]26       Chief Complaint   Patient presents with    Shortness of Breath     and low pulse ox for weeks. HISTORY OF PRESENT ILLNESS   (Location/Symptom, Timing/Onset,Context/Setting, Quality, Duration, Modifying Factors, Severity)  Note limiting factors. Lizz Gloria is a 80 y.o. female who presents to the emergency department for evaluation of sob, cough. Pt sent to ED for evaluation. She is not sure how long she has had the cough and felt sob. She denies fever. No reported fever from group home. She admits to occasional cough and feels sob but denies this currently. Location/Symptom - sob, cough  Onset - unknown  Context/Setting - as above  Quality - cough  Duration - occasional per pt  Modifying Factors - none obvous  Severity - moderate        Nursing Notes were reviewed. REVIEW OF SYSTEMS    (2-9 systems for level 4, 10 or more for level 5)     Review of Systems   Constitutional: Negative for chills, diaphoresis, fatigue and fever. HENT: Negative for congestion, rhinorrhea and sore throat. Eyes: Negative for photophobia and pain. Respiratory: Positive for cough and shortness of breath. Cardiovascular: Negative for chest pain and palpitations. Gastrointestinal: Negative for abdominal pain, diarrhea, nausea and vomiting. Genitourinary: Negative for dysuria and flank pain. Musculoskeletal: Negative for back pain. Skin: Negative for rash. Neurological: Negative for dizziness, light-headedness and headaches. Psychiatric/Behavioral: Negative. All other systems reviewed and are negative. Except as noted above the remainder of the review of systems was reviewed and negative.        PAST MEDICAL HISTORY     Past Medical History:   Diagnosis Date  Abnormality of gait and mobility     Age-related cataract 7/3/2015    Arthritis     Cerebral ischemia     [facility] pulmonary disease) ([facility])     Debility     Hypertension     Hypertensive urgency 9/11/2017    Left renal mass     Lung mass 9/11/2017    NSTEMI (non-ST elevated myocardial infarction) ([facility] 9/12/2017    Osteoporosis 4/20/2004    Pleural effusion 9/11/2017    Rheumatoid arthritis ([facility] 4/20/2004    Overview:  Dr. Darrick Degroot Right sided Hemorrhagic infarct ([facility]     Seizures ([facility]     Tachycardia 9/11/2017    Tachypnea 9/11/2017     Past Surgical History:   Procedure Laterality Date    CATARACT REMOVAL      CHOLECYSTECTOMY      SKIN BIOPSY       Social History     Socioeconomic History    Marital status:       Spouse name: None    Number of children: None    Years of education: None    Highest education level: None   Occupational History    None   Social Needs    Financial resource strain: None    Food insecurity     Worry: None     Inability: None    Transportation needs     Medical: None     Non-medical: None   Tobacco Use    Smoking status: Former Smoker    Smokeless tobacco: Never Used   Substance and Sexual Activity    Alcohol use: No    Drug use: No    Sexual activity: None   Lifestyle    Physical activity     Days per week: None     Minutes per session: None    Stress: None   Relationships    Social connections     Talks on phone: None     Gets together: None     Attends Jew service: None     Active member of club or organization: None     Attends meetings of clubs or organizations: None     Relationship status: None    Intimate partner violence     Fear of current or ex partner: None     Emotionally abused: None     Physically abused: None     Forced sexual activity: None   Other Topics Concern    None   Social History Narrative    The patient lives with her son and daughter-in-law in a two story home with two steps to enter the home.  There are bedrooms and bathroom are on the first floor. Her social support includes her family. She has a walker and garb bars at home. She was not receiving community services prior to admission. She does wear eyeglasses. It is not indicated that she has history of falls prior to admission. She was independent with mobility prior to admission. She required assistance for self care prior to admission. Her goal is to get stronger and return home. SCREENINGS             PHYSICAL EXAM    (up to 7 for level 4, 8 or more for level 5)     ED Triage Vitals [05/23/20 0806]   BP Temp Temp Source Pulse Resp SpO2 Height Weight   (!) 143/80 98.4 °F (36.9 °C) Oral 119 (!) 32 98 % 5' 2\" (1.575 m) 120 lb (54.4 kg)       Physical Exam  Vitals signs and nursing note reviewed. Constitutional:       General: She is not in acute distress. Appearance: Normal appearance. She is well-developed. She is not diaphoretic. HENT:      Head: Normocephalic and atraumatic. Eyes:      General: Lids are normal.      Conjunctiva/sclera: Conjunctivae normal.   Neck:      Musculoskeletal: Normal range of motion and neck supple. Cardiovascular:      Rate and Rhythm: Tachycardia present. Rhythm irregular. Pulses: Normal pulses. Heart sounds: Normal heart sounds. Pulmonary:      Effort: Pulmonary effort is normal.      Breath sounds: Rales present. Abdominal:      General: Bowel sounds are normal.      Palpations: Abdomen is soft. Tenderness: There is no abdominal tenderness. Musculoskeletal:      Right lower leg: Edema present. Left lower leg: Edema present. Lymphadenopathy:      Cervical: No cervical adenopathy. Skin:     General: Skin is warm and dry. Capillary Refill: Capillary refill takes less than 2 seconds. Findings: No rash. Neurological:      Mental Status: She is alert. Psychiatric:         Thought Content:  Thought content normal.         Judgment: Judgment normal.

## 2020-05-23 NOTE — FLOWSHEET NOTE
Pt arrived to unit and was in junctional rhythm in the 60s per MR. MR notified that patient now sinus, EKG to confirm. EKG confirms sinus with short IN interval, attending notified.  Electronically signed by Alma Delia Yusuf RN on 5/23/2020 at 5:08 PM

## 2020-05-23 NOTE — FLOWSHEET NOTE
Hospitalist notified of elevated BP as well as BNP, BLE edema, crackles, requested to have order to d/c fluids. Order received.  Electronically signed by Shanda Broderick RN on 5/23/2020 at 7:38 PM

## 2020-05-24 LAB
ANION GAP SERPL CALCULATED.3IONS-SCNC: 15 MEQ/L (ref 9–15)
BASOPHILS ABSOLUTE: 0 K/UL (ref 0–0.2)
BASOPHILS RELATIVE PERCENT: 0.2 %
BUN BLDV-MCNC: 63 MG/DL (ref 8–23)
CALCIUM SERPL-MCNC: 8.8 MG/DL (ref 8.5–9.9)
CHLORIDE BLD-SCNC: 102 MEQ/L (ref 95–107)
CO2: 25 MEQ/L (ref 20–31)
CREAT SERPL-MCNC: 1.94 MG/DL (ref 0.5–0.9)
EOSINOPHILS ABSOLUTE: 0 K/UL (ref 0–0.7)
EOSINOPHILS RELATIVE PERCENT: 0.1 %
GFR AFRICAN AMERICAN: 28.8
GFR NON-AFRICAN AMERICAN: 23.8
GLUCOSE BLD-MCNC: 162 MG/DL (ref 70–99)
HCT VFR BLD CALC: 30.8 % (ref 37–47)
HEMOGLOBIN: 9.6 G/DL (ref 12–16)
LYMPHOCYTES ABSOLUTE: 0.9 K/UL (ref 1–4.8)
LYMPHOCYTES RELATIVE PERCENT: 8.5 %
MCH RBC QN AUTO: 27.1 PG (ref 27–31.3)
MCHC RBC AUTO-ENTMCNC: 31.3 % (ref 33–37)
MCV RBC AUTO: 86.6 FL (ref 82–100)
MONOCYTES ABSOLUTE: 1.2 K/UL (ref 0.2–0.8)
MONOCYTES RELATIVE PERCENT: 11.3 %
NEUTROPHILS ABSOLUTE: 8.3 K/UL (ref 1.4–6.5)
NEUTROPHILS RELATIVE PERCENT: 79.9 %
PDW BLD-RTO: 18 % (ref 11.5–14.5)
PLATELET # BLD: 225 K/UL (ref 130–400)
POTASSIUM REFLEX MAGNESIUM: 5.1 MEQ/L (ref 3.4–4.9)
RBC # BLD: 3.56 M/UL (ref 4.2–5.4)
SARS-COV-2, NAAT: NOT DETECTED
SODIUM BLD-SCNC: 142 MEQ/L (ref 135–144)
URINE CULTURE, ROUTINE: NORMAL
WBC # BLD: 10.3 K/UL (ref 4.8–10.8)

## 2020-05-24 PROCEDURE — 36415 COLL VENOUS BLD VENIPUNCTURE: CPT

## 2020-05-24 PROCEDURE — 2580000003 HC RX 258: Performed by: INTERNAL MEDICINE

## 2020-05-24 PROCEDURE — 6370000000 HC RX 637 (ALT 250 FOR IP): Performed by: INTERNAL MEDICINE

## 2020-05-24 PROCEDURE — 51702 INSERT TEMP BLADDER CATH: CPT

## 2020-05-24 PROCEDURE — 2500000003 HC RX 250 WO HCPCS: Performed by: INTERNAL MEDICINE

## 2020-05-24 PROCEDURE — 6360000002 HC RX W HCPCS: Performed by: INTERNAL MEDICINE

## 2020-05-24 PROCEDURE — 2060000000 HC ICU INTERMEDIATE R&B

## 2020-05-24 PROCEDURE — 80048 BASIC METABOLIC PNL TOTAL CA: CPT

## 2020-05-24 PROCEDURE — U0002 COVID-19 LAB TEST NON-CDC: HCPCS

## 2020-05-24 PROCEDURE — 2700000000 HC OXYGEN THERAPY PER DAY

## 2020-05-24 PROCEDURE — 6360000002 HC RX W HCPCS

## 2020-05-24 PROCEDURE — 85025 COMPLETE CBC W/AUTO DIFF WBC: CPT

## 2020-05-24 PROCEDURE — 99222 1ST HOSP IP/OBS MODERATE 55: CPT | Performed by: INTERNAL MEDICINE

## 2020-05-24 RX ORDER — FUROSEMIDE 20 MG/1
20 TABLET ORAL DAILY
Status: DISCONTINUED | OUTPATIENT
Start: 2020-05-24 | End: 2020-05-26 | Stop reason: HOSPADM

## 2020-05-24 RX ORDER — FUROSEMIDE 10 MG/ML
20 INJECTION INTRAMUSCULAR; INTRAVENOUS ONCE
Status: COMPLETED | OUTPATIENT
Start: 2020-05-24 | End: 2020-05-24

## 2020-05-24 RX ORDER — FUROSEMIDE 10 MG/ML
INJECTION INTRAMUSCULAR; INTRAVENOUS
Status: COMPLETED
Start: 2020-05-24 | End: 2020-05-24

## 2020-05-24 RX ADMIN — HYDROXYCHLOROQUINE SULFATE 200 MG: 200 TABLET, FILM COATED ORAL at 09:13

## 2020-05-24 RX ADMIN — DOCUSATE SODIUM 50MG AND SENNOSIDES 8.6MG 1 TABLET: 8.6; 5 TABLET, FILM COATED ORAL at 22:11

## 2020-05-24 RX ADMIN — DILTIAZEM HYDROCHLORIDE 5 MG/HR: 5 INJECTION INTRAVENOUS at 03:00

## 2020-05-24 RX ADMIN — Medication 10 ML: at 00:40

## 2020-05-24 RX ADMIN — CEFTRIAXONE SODIUM 1 G: 1 INJECTION, POWDER, FOR SOLUTION INTRAMUSCULAR; INTRAVENOUS at 09:07

## 2020-05-24 RX ADMIN — ACETAMINOPHEN ORAL SOLUTION 325 MG: 325 SOLUTION ORAL at 22:10

## 2020-05-24 RX ADMIN — ESCITALOPRAM OXALATE 5 MG: 10 TABLET ORAL at 09:14

## 2020-05-24 RX ADMIN — ACETAMINOPHEN ORAL SOLUTION 325 MG: 325 SOLUTION ORAL at 09:14

## 2020-05-24 RX ADMIN — SULFAMETHOXAZOLE AND TRIMETHOPRIM 1 TABLET: 400; 80 TABLET ORAL at 02:40

## 2020-05-24 RX ADMIN — LAMOTRIGINE 25 MG: 25 TABLET ORAL at 11:06

## 2020-05-24 RX ADMIN — DILTIAZEM HYDROCHLORIDE 30 MG: 30 TABLET, FILM COATED ORAL at 15:39

## 2020-05-24 RX ADMIN — Medication 10 ML: at 22:32

## 2020-05-24 RX ADMIN — DILTIAZEM HYDROCHLORIDE 30 MG: 30 TABLET, FILM COATED ORAL at 22:10

## 2020-05-24 RX ADMIN — FUROSEMIDE 20 MG: 10 INJECTION INTRAMUSCULAR; INTRAVENOUS at 09:11

## 2020-05-24 RX ADMIN — DILTIAZEM HYDROCHLORIDE 30 MG: 30 TABLET, FILM COATED ORAL at 09:13

## 2020-05-24 RX ADMIN — DOCUSATE SODIUM 50MG AND SENNOSIDES 8.6MG 1 TABLET: 8.6; 5 TABLET, FILM COATED ORAL at 00:46

## 2020-05-24 RX ADMIN — FUROSEMIDE 20 MG: 10 INJECTION, SOLUTION INTRAMUSCULAR; INTRAVENOUS at 09:11

## 2020-05-24 RX ADMIN — DOCUSATE SODIUM 50MG AND SENNOSIDES 8.6MG 1 TABLET: 8.6; 5 TABLET, FILM COATED ORAL at 09:13

## 2020-05-24 RX ADMIN — Medication 10 ML: at 09:11

## 2020-05-24 RX ADMIN — ENOXAPARIN SODIUM 30 MG: 30 INJECTION SUBCUTANEOUS at 09:14

## 2020-05-24 ASSESSMENT — PAIN SCALES - GENERAL
PAINLEVEL_OUTOF10: 5
PAINLEVEL_OUTOF10: 0

## 2020-05-24 NOTE — CONSULTS
05/24/2020    GLUCOSE 162 05/24/2020     Magnesium:    Lab Results   Component Value Date    MG 2.2 05/23/2020     Troponin:    Lab Results   Component Value Date    TROPONINI 0.270 05/23/2020       EKG: EKG: normal sinus rhythm, nonspecific ST and T waves changes. Afib prior. ASSESSMENT/PLAN:         Active Hospital Problems    Diagnosis Date Noted   [facility]) [I48.91] 05/23/2020        New onset PAF, elevated troponin. Patient is palliative care for cancer. High risk for bleeding with full dose AC, has been started on lovenox DVT dose. Rate is controlled on cardizem currently. Given palliative status, probably acceptable to rate control, treat for diastolic CHF acute on chronic class III, decompensated and then discharge, with no intent for invasive therapy. Electronically signed by Jerrod Aldridge MD [facility] Director of Cardiology Services and CardiacCatheterization Laboratory  [facility], [place]   on 5/24/2020 at 9:03 AM

## 2020-05-24 NOTE — FLOWSHEET NOTE
Started her on a cardizem drip for atrial fib 5 ml./hour. she follows commands and able to drink water with some encouragement. 0500 patient heart rhythm is back to NSR,will continue to run the cardizem drip for a couple of hours ,her heart rate is at 85.

## 2020-05-25 LAB
ANION GAP SERPL CALCULATED.3IONS-SCNC: 13 MEQ/L (ref 9–15)
BUN BLDV-MCNC: 71 MG/DL (ref 8–23)
CALCIUM SERPL-MCNC: 8.9 MG/DL (ref 8.5–9.9)
CHLORIDE BLD-SCNC: 105 MEQ/L (ref 95–107)
CO2: 26 MEQ/L (ref 20–31)
CREAT SERPL-MCNC: 1.71 MG/DL (ref 0.5–0.9)
GFR AFRICAN AMERICAN: 33.3
GFR NON-AFRICAN AMERICAN: 27.5
GLUCOSE BLD-MCNC: 125 MG/DL (ref 60–115)
GLUCOSE BLD-MCNC: 127 MG/DL (ref 70–99)
PERFORMED ON: ABNORMAL
POTASSIUM SERPL-SCNC: 4.6 MEQ/L (ref 3.4–4.9)
SODIUM BLD-SCNC: 144 MEQ/L (ref 135–144)

## 2020-05-25 PROCEDURE — 6360000002 HC RX W HCPCS: Performed by: INTERNAL MEDICINE

## 2020-05-25 PROCEDURE — 51702 INSERT TEMP BLADDER CATH: CPT

## 2020-05-25 PROCEDURE — 99232 SBSQ HOSP IP/OBS MODERATE 35: CPT | Performed by: INTERNAL MEDICINE

## 2020-05-25 PROCEDURE — 2700000000 HC OXYGEN THERAPY PER DAY

## 2020-05-25 PROCEDURE — 2060000000 HC ICU INTERMEDIATE R&B

## 2020-05-25 PROCEDURE — 6370000000 HC RX 637 (ALT 250 FOR IP): Performed by: INTERNAL MEDICINE

## 2020-05-25 PROCEDURE — 36415 COLL VENOUS BLD VENIPUNCTURE: CPT

## 2020-05-25 PROCEDURE — 80048 BASIC METABOLIC PNL TOTAL CA: CPT

## 2020-05-25 PROCEDURE — 2580000003 HC RX 258: Performed by: INTERNAL MEDICINE

## 2020-05-25 RX ORDER — ACETAMINOPHEN 325 MG/1
650 TABLET ORAL 2 TIMES DAILY
Status: ON HOLD | COMMUNITY
End: 2020-05-25 | Stop reason: HOSPADM

## 2020-05-25 RX ORDER — ONDANSETRON 4 MG/1
4 TABLET, FILM COATED ORAL EVERY 8 HOURS PRN
Status: ON HOLD | COMMUNITY
End: 2020-05-25 | Stop reason: HOSPADM

## 2020-05-25 RX ADMIN — DILTIAZEM HYDROCHLORIDE 30 MG: 30 TABLET, FILM COATED ORAL at 17:43

## 2020-05-25 RX ADMIN — ENOXAPARIN SODIUM 30 MG: 30 INJECTION SUBCUTANEOUS at 09:02

## 2020-05-25 RX ADMIN — HYDROXYCHLOROQUINE SULFATE 200 MG: 200 TABLET, FILM COATED ORAL at 09:06

## 2020-05-25 RX ADMIN — DILTIAZEM HYDROCHLORIDE 30 MG: 30 TABLET, FILM COATED ORAL at 09:02

## 2020-05-25 RX ADMIN — ACETAMINOPHEN ORAL SOLUTION 325 MG: 325 SOLUTION ORAL at 09:02

## 2020-05-25 RX ADMIN — DILTIAZEM HYDROCHLORIDE 30 MG: 30 TABLET, FILM COATED ORAL at 23:25

## 2020-05-25 RX ADMIN — LAMOTRIGINE 25 MG: 25 TABLET ORAL at 10:58

## 2020-05-25 RX ADMIN — ACETAMINOPHEN ORAL SOLUTION 325 MG: 325 SOLUTION ORAL at 20:32

## 2020-05-25 RX ADMIN — FUROSEMIDE 20 MG: 20 TABLET ORAL at 09:01

## 2020-05-25 RX ADMIN — DOCUSATE SODIUM 50MG AND SENNOSIDES 8.6MG 1 TABLET: 8.6; 5 TABLET, FILM COATED ORAL at 09:02

## 2020-05-25 RX ADMIN — Medication 10 ML: at 09:02

## 2020-05-25 RX ADMIN — DOCUSATE SODIUM 50MG AND SENNOSIDES 8.6MG 1 TABLET: 8.6; 5 TABLET, FILM COATED ORAL at 20:32

## 2020-05-25 RX ADMIN — ESCITALOPRAM OXALATE 5 MG: 10 TABLET ORAL at 09:02

## 2020-05-25 RX ADMIN — PSYLLIUM HUSK 1 PACKET: 3.4 POWDER ORAL at 10:58

## 2020-05-25 RX ADMIN — Medication 10 ML: at 20:32

## 2020-05-25 ASSESSMENT — PAIN SCALES - GENERAL
PAINLEVEL_OUTOF10: 2
PAINLEVEL_OUTOF10: 0
PAINLEVEL_OUTOF10: 0
PAINLEVEL_OUTOF10: 5
PAINLEVEL_OUTOF10: 0

## 2020-05-25 NOTE — CARE COORDINATION
I called patients Adult home care and spoke to Michael Sheikh . She stated the patient would have [facility], [facility] as she had in past. She stated for us to call [facility] and have them call patients son and speak to patients Bria Arenas . I did call [facility] and they will call  Back. I did give name and birthdate of patient to them . 12- Spoke to [facility], [facility] and let them know we have order and did give [facility] of choice. They will send nurse here to do the Hospice consult between 4-5 today. I did let nurse Ahsan Cabezas know. I also let Hospice know patient can be d/cd once set up.

## 2020-05-25 NOTE — DISCHARGE INSTR - DIET

## 2020-05-26 VITALS
TEMPERATURE: 98.1 F | RESPIRATION RATE: 20 BRPM | BODY MASS INDEX: 25.58 KG/M2 | SYSTOLIC BLOOD PRESSURE: 129 MMHG | WEIGHT: 139 LBS | DIASTOLIC BLOOD PRESSURE: 50 MMHG | HEIGHT: 62 IN | HEART RATE: 81 BPM | OXYGEN SATURATION: 88 %

## 2020-05-26 PROCEDURE — 6370000000 HC RX 637 (ALT 250 FOR IP): Performed by: INTERNAL MEDICINE

## 2020-05-26 PROCEDURE — 51702 INSERT TEMP BLADDER CATH: CPT

## 2020-05-26 PROCEDURE — 2700000000 HC OXYGEN THERAPY PER DAY

## 2020-05-26 PROCEDURE — 2580000003 HC RX 258: Performed by: INTERNAL MEDICINE

## 2020-05-26 PROCEDURE — 93010 ELECTROCARDIOGRAM REPORT: CPT | Performed by: INTERNAL MEDICINE

## 2020-05-26 PROCEDURE — 6360000002 HC RX W HCPCS: Performed by: INTERNAL MEDICINE

## 2020-05-26 RX ADMIN — LAMOTRIGINE 25 MG: 25 TABLET ORAL at 09:50

## 2020-05-26 RX ADMIN — FUROSEMIDE 20 MG: 20 TABLET ORAL at 08:37

## 2020-05-26 RX ADMIN — DOCUSATE SODIUM 50MG AND SENNOSIDES 8.6MG 1 TABLET: 8.6; 5 TABLET, FILM COATED ORAL at 08:37

## 2020-05-26 RX ADMIN — ESCITALOPRAM OXALATE 5 MG: 10 TABLET ORAL at 08:37

## 2020-05-26 RX ADMIN — DILTIAZEM HYDROCHLORIDE 30 MG: 30 TABLET, FILM COATED ORAL at 08:37

## 2020-05-26 RX ADMIN — ACETAMINOPHEN ORAL SOLUTION 325 MG: 325 SOLUTION ORAL at 08:36

## 2020-05-26 RX ADMIN — ENOXAPARIN SODIUM 30 MG: 30 INJECTION SUBCUTANEOUS at 08:37

## 2020-05-26 RX ADMIN — HYDROXYCHLOROQUINE SULFATE 200 MG: 200 TABLET, FILM COATED ORAL at 08:37

## 2020-05-26 RX ADMIN — Medication 10 ML: at 08:37

## 2020-05-26 ASSESSMENT — PAIN SCALES - GENERAL
PAINLEVEL_OUTOF10: 0
PAINLEVEL_OUTOF10: 0

## 2020-05-26 NOTE — PROGRESS NOTES
[facility] referral completed with patient being found appropriate for [place] [id number] services at [facility]. Discharge Plan is to return patient to [address] [place] [address] on 5/26/2020. Transport to be initiated on 5/26/2020 by [place] [id number]. Consents and DNR-CC have been signed by Ajay Martinez.
Pt's daughter-in-law aware of transfer to [facility].
9.6 05/24/2020    HCT 30.8 05/24/2020     05/24/2020    MCV 86.6 05/24/2020    MCH 27.1 05/24/2020    MCHC 31.3 05/24/2020    RDW 18.0 05/24/2020    LYMPHOPCT 8.5 05/24/2020    MONOPCT 11.3 05/24/2020    BASOPCT 0.2 05/24/2020    MONOSABS 1.2 05/24/2020    LYMPHSABS 0.9 05/24/2020    EOSABS 0.0 05/24/2020    BASOSABS 0.0 05/24/2020     CMP:    Lab Results   Component Value Date     05/25/2020    K 4.6 05/25/2020    K 5.1 05/24/2020     05/25/2020    CO2 26 05/25/2020    BUN 71 05/25/2020    CREATININE 1.71 05/25/2020    GFRAA 33.3 05/25/2020    LABGLOM 27.5 05/25/2020    GLUCOSE 127 05/25/2020    PROT 7.3 05/23/2020    LABALBU 3.7 05/23/2020    CALCIUM 8.9 05/25/2020    BILITOT 0.8 05/23/2020    ALKPHOS 79 05/23/2020     05/23/2020    ALT 56 05/23/2020     BMP:    Lab Results   Component Value Date     05/25/2020    K 4.6 05/25/2020    K 5.1 05/24/2020     05/25/2020    CO2 26 05/25/2020    BUN 71 05/25/2020    LABALBU 3.7 05/23/2020    CREATININE 1.71 05/25/2020    CALCIUM 8.9 05/25/2020    GFRAA 33.3 05/25/2020    LABGLOM 27.5 05/25/2020    GLUCOSE 127 05/25/2020     Magnesium:    Lab Results   Component Value Date    MG 2.2 05/23/2020     Troponin:    Lab Results   Component Value Date    TROPONINI 0.270 05/23/2020         Assessment/Plan:    Active Hospital Problems    Diagnosis Date Noted    A-fib [facility]) [I48.91] 05/23/2020           Rate control, no AC due to high risk for bleed. Patient considering hospice, acceptable from cardiology perspective. Electronically signed by Juice Olea.  Burnadette MattesMD [facility] Director of Cardiology Services and Cardiac Catheterization Laboratory  [facility], [place]   on 5/25/2020 at 9:24 AM
onset atrial fibrillation:  on PO Cardizem. On Lovenox 30 mg daily . Following with cardiology. anticoag plan per cards. Switched to nsr. Acute diastolic heart failure: due to a fib: lasix 20 mg IV daily. Strict I and O. Following with cardiology. WINSTON: from volume overload.  Repeat bmp - downtrending.      DVT prophylaxis: Lovenox    No medical barriers to dc when cleared by cardiology.      35 minutes total care time, >1/2 in unit/floor time and care coordination     Electronically signed by Jesus Baxter MD on 5/25/2020 at 8:22 AM

## 2020-05-26 NOTE — DISCHARGE SUMMARY
Hospital Medicine Discharge Summary    Raghav Charles  :  3/25/1921  MRN:  [id number]    Admit date:  2020  Discharge date:  2020    Admitting Physician:  David Merino MD  Primary Care Physician:  Vicki Mendoza DO    Raghav Charles is a 80 y.o. female that was admitted and treated at [facility] for the following medical issues: Active Problems:    [facility])  Resolved Problems:    * No resolved hospital problems. *      Discharge Diagnoses: Active Problems:    [facility])  Resolved Problems:    * No resolved hospital problems. *    Chief Complaint   Patient presents with    Shortness of Breath     and low pulse ox for weeks. Hospital Course:   Raghav Charles is a 80 y.o. female who was admitted to the hospital with shortness of breath, found to also be in new onset atrial fibrillation. Started on oral Cardizem, converted back to normal sinus rhythm. IV diuresis for acute diastolic heart failure. was in WINSTON which started to improve. Given patient's overall mental and physical status, family opted for palliative/hospice care. Subsequently discharged to hospice  Pt was discharge in a stable condition. BP (!) 129/50   Pulse 81   Temp 98.1 °F (36.7 °C) (Oral)   Resp 20   Ht 5' 2\" (1.575 m)   Wt 139 lb (63 kg)   SpO2 (!) 88%   BMI 25.42 kg/m²     Patient was seen by the following consultants while admitted to [facility]:   Consults:  IP CONSULT TO HOSPITALIST  IP CONSULT TO CARDIOLOGY  IP CONSULT TO HOSPICE  IP CONSULT TO PALLIATIVE CARE    Significant Diagnostic Studies:    Xr Chest Portable    Result Date: 2020  XR CHEST PORTABLE Exam Date/Time:  2020 8:15 AM Clinical History:   cardiopulmonary evaluation    low pulse ox . Comparison:  10/3/2017. RESULT: Lungs and pleura:  Small to moderate right pleural effusion with associated pleural-parenchymal changes at the right lung base.  Biapical pleural parenchymal scarring. No pleural effusion. No pneumothorax. Normal pulmonary vascular pattern. Cardiomediastinal silhouette:  Stable enlarged, accentuated by technique. Other:  No acute osseous findings. Small to moderate right pleural effusion with associated pleural-parenchymal changes at the right lung base. Discharge Medications:       Millie Awan   Home Medication Instructions NWW:[id number]    Printed on:05/26/20 0020   Medication Information                      escitalopram (LEXAPRO) 5 MG tablet  Take 5 mg by mouth daily             hydroxychloroquine (PLAQUENIL) 200 MG tablet  Take by mouth daily             lamoTRIgine (LAMICTAL) 25 MG tablet  Take 25 mg by mouth daily                 Disposition:   If discharged to Home, Any [place] [id number] needs that were indicated and/or required as been addressed and set up by Social Work. Condition at discharge: Pt was medically stable at the time of discharge. Activity: activity as tolerated    Total time taken for discharging this patient: 40 minutes. Greater than 70% of time was spent focused exclusively on this patient. Time was taken to review chart, discuss plans with consultants, reconciling medications, discussing plan answering questions with patient.      Signed:  Jocelyne Ulloa

## 2020-05-28 LAB
BLOOD CULTURE, ROUTINE: NORMAL
CULTURE, BLOOD 2: NORMAL

## 2020-11-03 PROBLEM — I48.91 ATRIAL FIBRILLATION (HCC): Status: RESOLVED | Noted: 2017-09-19 | Resolved: 2020-11-03
